# Patient Record
Sex: MALE | Race: BLACK OR AFRICAN AMERICAN | Employment: OTHER | URBAN - METROPOLITAN AREA
[De-identification: names, ages, dates, MRNs, and addresses within clinical notes are randomized per-mention and may not be internally consistent; named-entity substitution may affect disease eponyms.]

---

## 2020-03-21 ENCOUNTER — APPOINTMENT (OUTPATIENT)
Dept: ULTRASOUND IMAGING | Age: 65
End: 2020-03-21
Attending: EMERGENCY MEDICINE
Payer: MEDICARE

## 2020-03-21 ENCOUNTER — HOSPITAL ENCOUNTER (EMERGENCY)
Age: 65
Discharge: HOME | End: 2020-03-21
Attending: EMERGENCY MEDICINE
Payer: MEDICARE

## 2020-03-21 VITALS
HEART RATE: 73 BPM | DIASTOLIC BLOOD PRESSURE: 95 MMHG | SYSTOLIC BLOOD PRESSURE: 176 MMHG | RESPIRATION RATE: 18 BRPM | TEMPERATURE: 98 F | OXYGEN SATURATION: 98 % | WEIGHT: 185 LBS | BODY MASS INDEX: 24.52 KG/M2 | HEIGHT: 73 IN

## 2020-03-21 DIAGNOSIS — M79.89 LEG SWELLING: Primary | ICD-10-CM

## 2020-03-21 PROCEDURE — 99283 EMERGENCY DEPT VISIT LOW MDM: CPT

## 2020-03-21 PROCEDURE — 93971 EXTREMITY STUDY: CPT
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
## 2020-03-22 NOTE — DISCHARGE INSTRUCTIONS
Follow up with one of the doctors listed. Return to the ER if your symptoms worsen.     [address] [facility] [address]  [phone]    [facility]  [phone] [phone]
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
## 2020-03-22 NOTE — ED NOTES
I have reviewed discharge instructions with the patient. The patient verbalized understanding. Patient left ED via Discharge Method: ambulatory to Home with self    Opportunity for questions and clarification provided. Patient given 0 scripts. To continue your aftercare when you leave the hospital, you may receive an automated call from our care team to check in on how you are doing. This is a free service and part of our promise to provide the best care and service to meet your aftercare needs.  If you have questions, or wish to unsubscribe from this service please call [phone]. Thank you for Choosing our [facility] Emergency Department.

## 2020-04-26 ENCOUNTER — APPOINTMENT (OUTPATIENT)
Dept: GENERAL RADIOLOGY | Age: 65
DRG: 856 | End: 2020-04-26
Attending: EMERGENCY MEDICINE
Payer: MEDICARE

## 2020-04-26 ENCOUNTER — HOSPITAL ENCOUNTER (INPATIENT)
Age: 65
LOS: 5 days | Discharge: HOME | DRG: 856 | End: 2020-05-01
Attending: EMERGENCY MEDICINE | Admitting: HOSPITALIST
Payer: MEDICARE

## 2020-04-26 DIAGNOSIS — T84.7XXA HARDWARE COMPLICATING WOUND INFECTION, INITIAL ENCOUNTER (HCC): ICD-10-CM

## 2020-04-26 DIAGNOSIS — L03.116 CELLULITIS OF LEFT LOWER EXTREMITY: Primary | ICD-10-CM

## 2020-04-26 PROBLEM — A41.9 SEPSIS (HCC): Status: ACTIVE | Noted: 2020-04-26

## 2020-04-26 PROBLEM — R44.3 HALLUCINATIONS: Status: ACTIVE | Noted: 2020-04-26

## 2020-04-26 PROBLEM — L08.9: Status: ACTIVE | Noted: 2020-04-26

## 2020-04-26 PROBLEM — S90.522A: Status: ACTIVE | Noted: 2020-04-26

## 2020-04-26 LAB
ALBUMIN SERPL-MCNC: 3 G/DL (ref 3.2–4.6)
ALBUMIN/GLOB SERPL: 0.6 {RATIO} (ref 1.2–3.5)
ALP SERPL-CCNC: 79 U/L (ref 50–136)
ALT SERPL-CCNC: 15 U/L (ref 12–65)
ANION GAP SERPL CALC-SCNC: 8 MMOL/L (ref 7–16)
AST SERPL-CCNC: 9 U/L (ref 15–37)
BASOPHILS # BLD: 0 K/UL (ref 0–0.2)
BASOPHILS NFR BLD: 0 % (ref 0–2)
BILIRUB SERPL-MCNC: 0.5 MG/DL (ref 0.2–1.1)
BUN SERPL-MCNC: 13 MG/DL (ref 8–23)
CALCIUM SERPL-MCNC: 9.7 MG/DL (ref 8.3–10.4)
CHLORIDE SERPL-SCNC: 104 MMOL/L (ref 98–107)
CO2 SERPL-SCNC: 27 MMOL/L (ref 21–32)
CREAT SERPL-MCNC: 1.04 MG/DL (ref 0.8–1.5)
DIFFERENTIAL METHOD BLD: ABNORMAL
EOSINOPHIL # BLD: 0 K/UL (ref 0–0.8)
EOSINOPHIL NFR BLD: 0 % (ref 0.5–7.8)
ERYTHROCYTE [DISTWIDTH] IN BLOOD BY AUTOMATED COUNT: 14.1 % (ref 11.9–14.6)
GLOBULIN SER CALC-MCNC: 5.3 G/DL (ref 2.3–3.5)
GLUCOSE BLD STRIP.AUTO-MCNC: 119 MG/DL (ref 65–100)
GLUCOSE SERPL-MCNC: 124 MG/DL (ref 65–100)
HCT VFR BLD AUTO: 34.4 % (ref 41.1–50.3)
HGB BLD-MCNC: 11 G/DL (ref 13.6–17.2)
IMM GRANULOCYTES # BLD AUTO: 0 K/UL (ref 0–0.5)
IMM GRANULOCYTES NFR BLD AUTO: 0 % (ref 0–5)
LACTATE SERPL-SCNC: 1.5 MMOL/L (ref 0.4–2)
LYMPHOCYTES # BLD: 1.1 K/UL (ref 0.5–4.6)
LYMPHOCYTES NFR BLD: 8 % (ref 13–44)
MCH RBC QN AUTO: 27 PG (ref 26.1–32.9)
MCHC RBC AUTO-ENTMCNC: 32 G/DL (ref 31.4–35)
MCV RBC AUTO: 84.5 FL (ref 79.6–97.8)
MONOCYTES # BLD: 0.8 K/UL (ref 0.1–1.3)
MONOCYTES NFR BLD: 6 % (ref 4–12)
NEUTS SEG # BLD: 12.3 K/UL (ref 1.7–8.2)
NEUTS SEG NFR BLD: 86 % (ref 43–78)
NRBC # BLD: 0 K/UL (ref 0–0.2)
PLATELET # BLD AUTO: 328 K/UL (ref 150–450)
PMV BLD AUTO: 9.8 FL (ref 9.4–12.3)
POTASSIUM SERPL-SCNC: 3.7 MMOL/L (ref 3.5–5.1)
PROCALCITONIN SERPL-MCNC: <0.05 NG/ML
PROT SERPL-MCNC: 8.3 G/DL (ref 6.3–8.2)
RBC # BLD AUTO: 4.07 M/UL (ref 4.23–5.6)
SODIUM SERPL-SCNC: 139 MMOL/L (ref 136–145)
WBC # BLD AUTO: 14.3 K/UL (ref 4.3–11.1)

## 2020-04-26 PROCEDURE — 99283 EMERGENCY DEPT VISIT LOW MDM: CPT

## 2020-04-26 PROCEDURE — 74011250636 HC RX REV CODE- 250/636: Performed by: EMERGENCY MEDICINE

## 2020-04-26 PROCEDURE — 87040 BLOOD CULTURE FOR BACTERIA: CPT

## 2020-04-26 PROCEDURE — 84145 PROCALCITONIN (PCT): CPT

## 2020-04-26 PROCEDURE — 87186 SC STD MICRODIL/AGAR DIL: CPT

## 2020-04-26 PROCEDURE — 87205 SMEAR GRAM STAIN: CPT

## 2020-04-26 PROCEDURE — 36415 COLL VENOUS BLD VENIPUNCTURE: CPT

## 2020-04-26 PROCEDURE — 74011000258 HC RX REV CODE- 258: Performed by: EMERGENCY MEDICINE

## 2020-04-26 PROCEDURE — 80053 COMPREHEN METABOLIC PANEL: CPT

## 2020-04-26 PROCEDURE — 96375 TX/PRO/DX INJ NEW DRUG ADDON: CPT

## 2020-04-26 PROCEDURE — 65270000029 HC RM PRIVATE

## 2020-04-26 PROCEDURE — 74011250636 HC RX REV CODE- 250/636: Performed by: HOSPITALIST

## 2020-04-26 PROCEDURE — 74011250637 HC RX REV CODE- 250/637: Performed by: HOSPITALIST

## 2020-04-26 PROCEDURE — 83605 ASSAY OF LACTIC ACID: CPT

## 2020-04-26 PROCEDURE — 73590 X-RAY EXAM OF LOWER LEG: CPT

## 2020-04-26 PROCEDURE — 85025 COMPLETE CBC W/AUTO DIFF WBC: CPT

## 2020-04-26 PROCEDURE — 87077 CULTURE AEROBIC IDENTIFY: CPT

## 2020-04-26 PROCEDURE — 82962 GLUCOSE BLOOD TEST: CPT

## 2020-04-26 PROCEDURE — 96365 THER/PROPH/DIAG IV INF INIT: CPT

## 2020-04-26 PROCEDURE — 73610 X-RAY EXAM OF ANKLE: CPT

## 2020-04-26 RX ORDER — IBUPROFEN 200 MG
1 TABLET ORAL
Status: DISCONTINUED | OUTPATIENT
Start: 2020-04-26 | End: 2020-05-01 | Stop reason: HOSPADM

## 2020-04-26 RX ORDER — VANCOMYCIN 2 GRAM/500 ML IN 0.9 % SODIUM CHLORIDE INTRAVENOUS
2000 ONCE
Status: COMPLETED | OUTPATIENT
Start: 2020-04-26 | End: 2020-04-26

## 2020-04-26 RX ORDER — ENOXAPARIN SODIUM 100 MG/ML
40 INJECTION SUBCUTANEOUS EVERY 24 HOURS
Status: DISCONTINUED | OUTPATIENT
Start: 2020-04-26 | End: 2020-04-27

## 2020-04-26 RX ORDER — ACETAMINOPHEN 325 MG/1
650 TABLET ORAL
Status: DISCONTINUED | OUTPATIENT
Start: 2020-04-26 | End: 2020-05-01 | Stop reason: HOSPADM

## 2020-04-26 RX ORDER — ADHESIVE BANDAGE
30 BANDAGE TOPICAL DAILY PRN
Status: DISCONTINUED | OUTPATIENT
Start: 2020-04-26 | End: 2020-05-01 | Stop reason: HOSPADM

## 2020-04-26 RX ORDER — SODIUM CHLORIDE 0.9 % (FLUSH) 0.9 %
5-40 SYRINGE (ML) INJECTION AS NEEDED
Status: DISCONTINUED | OUTPATIENT
Start: 2020-04-26 | End: 2020-05-01 | Stop reason: HOSPADM

## 2020-04-26 RX ORDER — HYDROCODONE BITARTRATE AND ACETAMINOPHEN 7.5; 325 MG/1; MG/1
1 TABLET ORAL
Status: DISCONTINUED | OUTPATIENT
Start: 2020-04-26 | End: 2020-04-29

## 2020-04-26 RX ORDER — MORPHINE SULFATE 2 MG/ML
2 INJECTION, SOLUTION INTRAMUSCULAR; INTRAVENOUS
Status: DISCONTINUED | OUTPATIENT
Start: 2020-04-26 | End: 2020-04-29

## 2020-04-26 RX ORDER — ONDANSETRON 2 MG/ML
4 INJECTION INTRAMUSCULAR; INTRAVENOUS
Status: DISCONTINUED | OUTPATIENT
Start: 2020-04-26 | End: 2020-05-01 | Stop reason: HOSPADM

## 2020-04-26 RX ORDER — DIPHENHYDRAMINE HCL 25 MG
25 CAPSULE ORAL
Status: DISCONTINUED | OUTPATIENT
Start: 2020-04-26 | End: 2020-04-30

## 2020-04-26 RX ORDER — SODIUM CHLORIDE 9 MG/ML
125 INJECTION, SOLUTION INTRAVENOUS CONTINUOUS
Status: DISCONTINUED | OUTPATIENT
Start: 2020-04-26 | End: 2020-04-27

## 2020-04-26 RX ORDER — SODIUM CHLORIDE 0.9 % (FLUSH) 0.9 %
5-40 SYRINGE (ML) INJECTION EVERY 8 HOURS
Status: DISCONTINUED | OUTPATIENT
Start: 2020-04-26 | End: 2020-05-01 | Stop reason: HOSPADM

## 2020-04-26 RX ORDER — NALOXONE HYDROCHLORIDE 0.4 MG/ML
0.4 INJECTION, SOLUTION INTRAMUSCULAR; INTRAVENOUS; SUBCUTANEOUS AS NEEDED
Status: DISCONTINUED | OUTPATIENT
Start: 2020-04-26 | End: 2020-05-01 | Stop reason: HOSPADM

## 2020-04-26 RX ORDER — LANOLIN ALCOHOL/MO/W.PET/CERES
100 CREAM (GRAM) TOPICAL DAILY
Status: DISCONTINUED | OUTPATIENT
Start: 2020-04-27 | End: 2020-05-01 | Stop reason: HOSPADM

## 2020-04-26 RX ORDER — VANCOMYCIN HYDROCHLORIDE
1250 EVERY 12 HOURS
Status: DISCONTINUED | OUTPATIENT
Start: 2020-04-27 | End: 2020-04-27

## 2020-04-26 RX ORDER — LORAZEPAM 2 MG/ML
2 INJECTION INTRAMUSCULAR
Status: DISCONTINUED | OUTPATIENT
Start: 2020-04-26 | End: 2020-04-30

## 2020-04-26 RX ADMIN — Medication 10 ML: at 21:31

## 2020-04-26 RX ADMIN — PIPERACILLIN SODIUM,TAZOBACTAM SODIUM 4.5 G: 4; .5 INJECTION, POWDER, FOR SOLUTION INTRAVENOUS at 16:39

## 2020-04-26 RX ADMIN — ENOXAPARIN SODIUM 40 MG: 40 INJECTION SUBCUTANEOUS at 21:31

## 2020-04-26 RX ADMIN — SODIUM CHLORIDE 1000 ML: 900 INJECTION, SOLUTION INTRAVENOUS at 16:39

## 2020-04-26 RX ADMIN — ACETAMINOPHEN 650 MG: 325 TABLET, FILM COATED ORAL at 23:49

## 2020-04-26 RX ADMIN — SODIUM CHLORIDE 125 ML/HR: 900 INJECTION, SOLUTION INTRAVENOUS at 21:31

## 2020-04-26 RX ADMIN — VANCOMYCIN HYDROCHLORIDE 2000 MG: 10 INJECTION, POWDER, LYOPHILIZED, FOR SOLUTION INTRAVENOUS at 17:47

## 2020-04-26 NOTE — PROGRESS NOTES
TRANSFER - IN REPORT:    Verbal report received from Aly RN(name) on Will Mian  being received from ED(unit) for routine progression of care      Report consisted of patients Situation, Background, Assessment and   Recommendations(SBAR). Information from the following report(s) SBAR, Kardex, ED Summary, Intake/Output, MAR, Accordion and Recent Results was reviewed with the receiving nurse. Opportunity for questions and clarification was provided. Assessment completed upon patients arrival to unit and care assumed.

## 2020-04-26 NOTE — PROGRESS NOTES
04/26/20 1905   Dual Skin Pressure Injury Assessment   Dual Skin Pressure Injury Assessment WDL   Second Care Provider (Based on Facility Policy) Zina Jeronimo RN   Skin Integumentary   Skin Integumentary (WDL) X   Skin Color Ecchymosis (comment)  (Scattered.)   Skin Condition/Temp Dry;Flaky;Fragile; Warm   Skin Integrity Wound (add Wound LDA)  (L ankle. )   Turgor Non-tenting   Hair Growth Present   Varicosities Absent    Pressure  Injury Documentation No Pressure Injury Noted-Pressure Ulcer Prevention Initiated   Wound Prevention and Protection Methods   Orientation of Wound Prevention Posterior   Location of Wound Prevention Sacrum/Coccyx   Wound Offloading (Prevention Methods) Bed, pressure reduction mattress;Pillows;Repositioning     Sacral area intact. Dry, flaky skin. Area noted to Left ankle. Skin turgor good. Mucous membranes pink, moist, intact. Thick, discolored toenails. One suture noted to ankle.

## 2020-04-26 NOTE — ED NOTES
TRANSFER - OUT REPORT:    Verbal report given to Tammy Ramez (name) on Sukhdeep Dueñas  being transferred to [address] [place] [address] (unit) for routine progression of care       Report consisted of patients Situation, Background, Assessment and   Recommendations(SBAR). Information from the following report(s) SBAR was reviewed with the receiving nurse. Lines:   Peripheral IV 04/26/20 Left Hand (Active)   Site Assessment Clean, dry, & intact 4/26/2020  3:45 PM   Phlebitis Assessment 0 4/26/2020  3:45 PM   Infiltration Assessment 0 4/26/2020  3:45 PM   Dressing Status Clean, dry, & intact 4/26/2020  3:45 PM   Dressing Type Transparent 4/26/2020  3:45 PM   Hub Color/Line Status Pink 4/26/2020  3:45 PM       Peripheral IV 04/26/20 Left Forearm (Active)   Site Assessment Clean, dry, & intact 4/26/2020  4:45 PM   Phlebitis Assessment 0 4/26/2020  4:45 PM   Infiltration Assessment 0 4/26/2020  4:45 PM   Dressing Status Clean, dry, & intact 4/26/2020  4:45 PM        Opportunity for questions and clarification was provided.       Patient transported with:   Paradial
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
## 2020-04-26 NOTE — PROGRESS NOTES
Pharmacokinetic Consult to Pharmacist    Natalee Mario is a 75 y.o. male being treated for SSTI with vancomycin. Height: 6' 1\" (185.4 cm)  Weight: 81.6 kg (180 lb)  Lab Results   Component Value Date/Time    BUN 13 04/26/2020 03:45 PM    Creatinine 1.04 04/26/2020 03:45 PM    WBC 14.3 (H) 04/26/2020 03:45 PM    Procalcitonin <0.05 04/26/2020 04:00 PM    Lactic acid 1.5 04/26/2020 04:07 PM      Estimated Creatinine Clearance: 79 mL/min (based on SCr of 1.04 mg/dL). CULTURES:  4/26 BCx: pending   Wound: pending      Day 1 of vancomycin. Goal trough is 10-20. Vancomycin dose initiated at 2g load x1, then 1250mg q 12h. Will continue to follow patient and order levels when clinically indicated.     Thank you,  Nickolas Peters, PharmD  Clinical Pharmacist  [phone]

## 2020-04-27 LAB
ANION GAP SERPL CALC-SCNC: 6 MMOL/L (ref 7–16)
BUN SERPL-MCNC: 11 MG/DL (ref 8–23)
CALCIUM SERPL-MCNC: 8.9 MG/DL (ref 8.3–10.4)
CHLORIDE SERPL-SCNC: 109 MMOL/L (ref 98–107)
CO2 SERPL-SCNC: 26 MMOL/L (ref 21–32)
CREAT SERPL-MCNC: 0.84 MG/DL (ref 0.8–1.5)
ERYTHROCYTE [DISTWIDTH] IN BLOOD BY AUTOMATED COUNT: 13.9 % (ref 11.9–14.6)
GLUCOSE SERPL-MCNC: 102 MG/DL (ref 65–100)
HCT VFR BLD AUTO: 32.1 % (ref 41.1–50.3)
HGB BLD-MCNC: 10.3 G/DL (ref 13.6–17.2)
MCH RBC QN AUTO: 27.2 PG (ref 26.1–32.9)
MCHC RBC AUTO-ENTMCNC: 32.1 G/DL (ref 31.4–35)
MCV RBC AUTO: 84.7 FL (ref 79.6–97.8)
NRBC # BLD: 0 K/UL (ref 0–0.2)
PLATELET # BLD AUTO: 314 K/UL (ref 150–450)
PMV BLD AUTO: 10.1 FL (ref 9.4–12.3)
POTASSIUM SERPL-SCNC: 3.8 MMOL/L (ref 3.5–5.1)
RBC # BLD AUTO: 3.79 M/UL (ref 4.23–5.6)
SODIUM SERPL-SCNC: 141 MMOL/L (ref 136–145)
WBC # BLD AUTO: 11.9 K/UL (ref 4.3–11.1)

## 2020-04-27 PROCEDURE — 74011250636 HC RX REV CODE- 250/636: Performed by: HOSPITALIST

## 2020-04-27 PROCEDURE — 65270000029 HC RM PRIVATE

## 2020-04-27 PROCEDURE — 85027 COMPLETE CBC AUTOMATED: CPT

## 2020-04-27 PROCEDURE — 36415 COLL VENOUS BLD VENIPUNCTURE: CPT

## 2020-04-27 PROCEDURE — 80048 BASIC METABOLIC PNL TOTAL CA: CPT

## 2020-04-27 PROCEDURE — 74011250637 HC RX REV CODE- 250/637: Performed by: HOSPITALIST

## 2020-04-27 RX ADMIN — Medication 5 ML: at 14:29

## 2020-04-27 RX ADMIN — Medication 10 ML: at 05:20

## 2020-04-27 RX ADMIN — ACETAMINOPHEN 650 MG: 325 TABLET, FILM COATED ORAL at 16:17

## 2020-04-27 RX ADMIN — Medication 10 ML: at 22:31

## 2020-04-27 RX ADMIN — VANCOMYCIN HYDROCHLORIDE 1250 MG: 10 INJECTION, POWDER, LYOPHILIZED, FOR SOLUTION INTRAVENOUS at 07:41

## 2020-04-27 RX ADMIN — Medication 100 MG: at 08:21
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
## 2020-04-27 NOTE — PROGRESS NOTES
Chart screened by  for potential discharge needs or concerns. None identified at this time. Please notify/consult  if any discharge needs arise.     Discharge Location  Discharge Placement: Unable to determine at this time

## 2020-04-27 NOTE — PROGRESS NOTES
Problem: Falls - Risk of  Goal: *Absence of Falls  Description: Document Cristian Collins Fall Risk and appropriate interventions in the flowsheet.   Outcome: Progressing Towards Goal  Note: Fall Risk Interventions:  Mobility Interventions: Bed/chair exit alarm, Communicate number of staff needed for ambulation/transfer, Utilize walker, cane, or other assistive device    Mentation Interventions: Bed/chair exit alarm    Medication Interventions: Bed/chair exit alarm    Elimination Interventions: Bed/chair exit alarm, Call light in reach, Patient to call for help with toileting needs, Urinal in reach              Problem: Patient Education: Go to Patient Education Activity  Goal: Patient/Family Education  Outcome: Progressing Towards Goal     Problem: Infection - Risk of, Multi-drug Resistant Organism Colonization (MDRO)  Goal: *Absence of MDRO colonization  Outcome: Progressing Towards Goal  Goal: *Absence of infection signs and symptoms  Outcome: Progressing Towards Goal     Problem: Patient Education: Go to Patient Education Activity  Goal: Patient/Family Education  Outcome: Progressing Towards Goal     Problem: Pain  Goal: *Control of Pain  Outcome: Progressing Towards Goal  Goal: *PALLIATIVE CARE:  Alleviation of Pain  Outcome: Progressing Towards Goal     Problem: Patient Education: Go to Patient Education Activity  Goal: Patient/Family Education  Outcome: Progressing Towards Goal

## 2020-04-27 NOTE — H&P (VIEW-ONLY)
Consult Patient: El Flannery MRN: [id number]  SSN: [id number] YOB: 1955  Age: 75 y.o. Sex: male Subjective:  
  
El Flannery is a 75 y.o. male it was very hard to get any history from. He does not seem to give appropriate answers to any of my questions. It appears from his history that at some point he had surgery for ankle fracture in [place]. He presented to our hospital with drainage from a left ankle wound. He thinks the surgery was over a year ago. No past medical history on file. No past surgical history on file. FAMHX -No history of inflammatory arthritis Social History Tobacco Use  Smoking status: Not on file Substance Use Topics  Alcohol use: Not on file Current Facility-Administered Medications Medication Dose Route Frequency Provider Last Rate Last Dose  [START ON 4/28/2020] Vancomycin Trough Level Reminder   Other Cristian King MD      
 sodium chloride (NS) flush 5-40 mL  5-40 mL IntraVENous Q8H Melissa Iglesias MD   10 mL at 04/27/20 0520  
 sodium chloride (NS) flush 5-40 mL  5-40 mL IntraVENous PRN Melissa Iglesias MD      
 acetaminophen (TYLENOL) tablet 650 mg  650 mg Oral Q4H PRN Melissa Iglesias MD   650 mg at 04/26/20 2349  
 HYDROcodone-acetaminophen (NORCO) 7.5-325 mg per tablet 1 Tab  1 Tab Oral Q4H PRN Melissa Iglesias MD      
 morphine injection 2 mg  2 mg IntraVENous Q4H PRN Melissa Iglesias MD      
 naloxone [facility]) injection 0.4 mg  0.4 mg IntraVENous PRN Melissa Iglesias MD      
 diphenhydrAMINE (BENADRYL) capsule 25 mg  25 mg Oral Q4H PRN Melissa Iglesias MD      
 ondansetron [facility]) injection 4 mg  4 mg IntraVENous Q4H PRN Melissa Iglesias MD      
 magnesium hydroxide (MILK OF MAGNESIA) 400 mg/5 mL oral suspension 30 mL  30 mL Oral DAILY PRN MD Rosa Denny nicotine (NICODERM CQ) 21 mg/24 hr patch 1 Patch  1 Patch TransDERmal DAILY PRN Melissa Iglesias MD      
  LORazepam (ATIVAN) injection 2 mg  2 mg IntraVENous Q2H PRN Kedar White MD      
 thiamine HCL (B-1) tablet 100 mg  100 mg Oral DAILY Kedar White MD   100 mg at 04/27/20 1029 No Known Allergies Review of Systems: I do not believe he is able to give a review of systems A comprehensive review of systems was negative except for that written in the History of Present Illness. Objective:  
 
Vitals:  
 04/26/20 2338 04/27/20 0347 04/27/20 0738 04/27/20 1125 BP: 131/86 147/74 154/88 169/88 Pulse: (!) 102 84 (!) 101 (!) 103 Resp: 18 18 17 18 Temp: (!) 101 °F (38.3 °C) 98.9 °F (37.2 °C) 99.7 °F (37.6 °C) 98.1 °F (36.7 °C) SpO2: 98% 98% 96% 96% Weight:      
Height:      
  
 
Physical Exam: 
Physical Exam: 
General:  Alert, cooperative, no distress, appears stated age. Orientation he appears to be oriented to person Eyes:  Conjunctivae/corneas clear. PERRL, EOMs intact. Fundi benign Ears:  Normal TMs and external ear canals both ears. Nose: Nares normal. Septum midline. Mucosa normal. No drainage or sinus tenderness. Mouth/Throat: Lips, mucosa, and tongue normal. Teeth and gums normal.  
Neck: Supple, symmetrical, trachea midline, no adenopathy, thyroid: no enlargment/tenderness/nodules, no carotid bruit and no JVD. Back:   Symmetric, no curvature. ROM normal. No CVA tenderness. Lungs:   Clear to auscultation bilaterally. Heart:  Regular rate and rhythm, S1, S2 normal, no murmur, click, rub or gallop. Abdomen:   Soft, non-tender. Bowel sounds normal. No masses,  No organomegaly. His left lower extremity shows an open wound in line with his previous incision which is consistent with his open reduction internal fixation of his left lateral malleolus. There is some purulent drainage from this area. He is very tender to palpation but withdraws even with light touch throughout his entire left lower extremity.   Is difficult to get a detailed neurologic examination but his sensation appears to be grossly intact. Is also difficult to have him hold still long enough to really see if he has intact pulses but his capillary refill is intact. Assessment:  
 
Hospital Problems  Never Reviewed Codes Class Noted POA Sepsis ([facility] [address] ICD-10-CM: A41.9 ICD-9-CM: 038.9, 995.91  4/26/2020 Unknown Infected blister of left ankle ICD-10-CM: D15.005F, L08.9 ICD-9-CM: 916.3  4/26/2020 Unknown Hallucinations ICD-10-CM: R44.3 ICD-9-CM: 780.1  4/26/2020 Unknown *Operative wound infection left ankle for lateral malleolus fracture Xrays and or studies: 
 
X-rays appear to show a healed left lateral malleolus fracture with plate and screw fixation and syndesmosis screws Plan:  
 
I believe that as long as he is not clinically septic it would be wise to hold his IV antibiotics. The best chance of clearing this would be to be able to treat him with culture directed antibiotic therapy. The best chance to get a surgical culture that actually shows what is the culprit as far as what bacteria is causing this would be to get intraoperative cultures off of IV antibiotics so I am going to hold his vancomycin as long as the primary care team thinks this is reasonable. Obviously if the primary care team thinks that he really needs this for his overall health then that would be a different story but as long as I think it is reasonable to try to wait until after intraoperative cultures I think that would be to his advantage. If I had known about the patient and he had been n.p.o. we could have done his surgery today. However it appears that he has eaten breakfast and lunch. The plan will be to likely proceed with hardware removal and debridement of his left ankle which will probably be Wednesday now because of OR availability.  
 
Signed By: Aiden Art MD

## 2020-04-27 NOTE — PROGRESS NOTES
Consult received: chart reviewed. Patient underwent ORIF for trimalleolar ankle fracture 8/19' and has been seen in [facility] for wound infection. He was referred to wound care back in October. If still infected may require hardware removal. Will see tomorrow AM and discuss with Dr. Ivan Landaverde.  Will make NPO after midnight and hold lovenox

## 2020-04-27 NOTE — CONSULTS
Consult    Patient: Gaurav Bergman MRN: [id number]  SSN: [id number]    YOB: 1955  Age: 75 y.o. Sex: male      Subjective:      Gaurav Bergman is a 75 y.o. male it was very hard to get any history from. He does not seem to give appropriate answers to any of my questions. It appears from his history that at some point he had surgery for ankle fracture in [place]. He presented to our hospital with drainage from a left ankle wound. He thinks the surgery was over a year ago. No past medical history on file. No past surgical history on file.    FAMHX -No history of inflammatory arthritis   Social History     Tobacco Use    Smoking status: Not on file   Substance Use Topics    Alcohol use: Not on file      Current Facility-Administered Medications   Medication Dose Route Frequency Provider Last Rate Last Dose    [START ON 4/28/2020] Vancomycin Trough Level Reminder   Other Arpit Henderson MD        sodium chloride (NS) flush 5-40 mL  5-40 mL IntraVENous Q8H Thierry Floyd MD   10 mL at 04/27/20 0520    sodium chloride (NS) flush 5-40 mL  5-40 mL IntraVENous PRN Thierry Floyd MD        acetaminophen (TYLENOL) tablet 650 mg  650 mg Oral Q4H PRN Thierry Floyd MD   650 mg at 04/26/20 2349    HYDROcodone-acetaminophen (NORCO) 7.5-325 mg per tablet 1 Tab  1 Tab Oral Q4H PRN Thierry Floyd MD        morphine injection 2 mg  2 mg IntraVENous Q4H PRN Thierry Floyd MD        naloxone [facility]) injection 0.4 mg  0.4 mg IntraVENous PRN Thierry Floyd MD        diphenhydrAMINE (BENADRYL) capsule 25 mg  25 mg Oral Q4H PRN Thierry Floyd MD        ondansetron [facility]) injection 4 mg  4 mg IntraVENous Q4H PRN Thierry Floyd MD        magnesium hydroxide (MILK OF MAGNESIA) 400 mg/5 mL oral suspension 30 mL  30 mL Oral DAILY PRN MD Wolf Johnson nicotine (NICODERM CQ) 21 mg/24 hr patch 1 Patch  1 Patch TransDERmal DAILY PRN Thierry Floyd MD        LORazepam (ATIVAN) injection 2 mg  2 mg IntraVENous Q2H PRN Chico Vaughn MD        thiamine HCL (B-1) tablet 100 mg  100 mg Oral DAILY Chico Vaughn MD   100 mg at 04/27/20 0470        No Known Allergies    Review of Systems: I do not believe he is able to give a review of systems  A comprehensive review of systems was negative except for that written in the History of Present Illness. Objective:     Vitals:    04/26/20 2338 04/27/20 0347 04/27/20 0738 04/27/20 1125   BP: 131/86 147/74 154/88 169/88   Pulse: (!) 102 84 (!) 101 (!) 103   Resp: 18 18 17 18   Temp: (!) 101 °F (38.3 °C) 98.9 °F (37.2 °C) 99.7 °F (37.6 °C) 98.1 °F (36.7 °C)   SpO2: 98% 98% 96% 96%   Weight:       Height:            Physical Exam:  Physical Exam:  General:  Alert, cooperative, no distress, appears stated age. Orientation he appears to be oriented to person   Eyes:  Conjunctivae/corneas clear. PERRL, EOMs intact. Fundi benign   Ears:  Normal TMs and external ear canals both ears. Nose: Nares normal. Septum midline. Mucosa normal. No drainage or sinus tenderness. Mouth/Throat: Lips, mucosa, and tongue normal. Teeth and gums normal.   Neck: Supple, symmetrical, trachea midline, no adenopathy, thyroid: no enlargment/tenderness/nodules, no carotid bruit and no JVD. Back:   Symmetric, no curvature. ROM normal. No CVA tenderness. Lungs:   Clear to auscultation bilaterally. Heart:  Regular rate and rhythm, S1, S2 normal, no murmur, click, rub or gallop. Abdomen:   Soft, non-tender. Bowel sounds normal. No masses,  No organomegaly. His left lower extremity shows an open wound in line with his previous incision which is consistent with his open reduction internal fixation of his left lateral malleolus. There is some purulent drainage from this area. He is very tender to palpation but withdraws even with light touch throughout his entire left lower extremity.   Is difficult to get a detailed neurologic examination but his sensation appears to be grossly intact. Is also difficult to have him hold still long enough to really see if he has intact pulses but his capillary refill is intact. Assessment:     Hospital Problems  Never Reviewed          Codes Class Noted POA    Sepsis ([facility] [address] ICD-10-CM: A41.9  ICD-9-CM: 038.9, 995.91  4/26/2020 Unknown        Infected blister of left ankle ICD-10-CM: L81.045L, L08.9  ICD-9-CM: 916.3  4/26/2020 Unknown        Hallucinations ICD-10-CM: R44.3  ICD-9-CM: 780.1  4/26/2020 Unknown            *Operative wound infection left ankle for lateral malleolus fracture    Xrays and or studies:    X-rays appear to show a healed left lateral malleolus fracture with plate and screw fixation and syndesmosis screws  Plan:     I believe that as long as he is not clinically septic it would be wise to hold his IV antibiotics. The best chance of clearing this would be to be able to treat him with culture directed antibiotic therapy. The best chance to get a surgical culture that actually shows what is the culprit as far as what bacteria is causing this would be to get intraoperative cultures off of IV antibiotics so I am going to hold his vancomycin as long as the primary care team thinks this is reasonable. Obviously if the primary care team thinks that he really needs this for his overall health then that would be a different story but as long as I think it is reasonable to try to wait until after intraoperative cultures I think that would be to his advantage. If I had known about the patient and he had been n.p.o. we could have done his surgery today. However it appears that he has eaten breakfast and lunch. The plan will be to likely proceed with hardware removal and debridement of his left ankle which will probably be Wednesday now because of OR availability.     Signed By: Seb Rehamn MD

## 2020-04-28 PROCEDURE — 74011250637 HC RX REV CODE- 250/637: Performed by: HOSPITALIST

## 2020-04-28 PROCEDURE — 65270000029 HC RM PRIVATE

## 2020-04-28 RX ADMIN — Medication 100 MG: at 08:24

## 2020-04-28 RX ADMIN — Medication 10 ML: at 20:38

## 2020-04-28 RX ADMIN — Medication 10 ML: at 15:37

## 2020-04-28 RX ADMIN — Medication 10 ML: at 05:57

## 2020-04-28 NOTE — PROGRESS NOTES
Initial visit by  to convey care and concern and encourage patient that  services are available if desired. Mr. Heidi Serrano seemed discouraged. Mr. Heidi Serrano has no local family support. I offered spiritual interventions including empathic listening, affirmation of emotions, exploration of coping skills, and prayer as requested. Chaplains remain available for support.      Cherry Stephens 66  Board Certified

## 2020-04-28 NOTE — PROGRESS NOTES
2020             Admit Date: 2020  Admit Diagnosis: Infected blister of left ankle [S90.522A, L08.9]       Principle Problem: <principal problem not specified>. Subjective: No new complaints, No SOB, No Chest Pain, No Nausea or Vomiting     Objective:   Vital Signs are Stable, No Acute Distress, Alert,  Dressing is Dry. Assessment / Plan :  Patient Active Problem List   Diagnosis Code    [facility] A41.9    Infected blister of left ankle S90.522A, L08.9    Hallucinations R44.3      Patient Vitals for the past 8 hrs:   BP Temp Pulse Resp SpO2   20 0731 159/84 99.1 °F (37.3 °C) 89 17 95 %   20 0351 151/66 98.8 °F (37.1 °C) 97 16 97 %    Temp (24hrs), Av.1 °F (37.3 °C), Min:98.1 °F (36.7 °C), Max:100.5 °F (38.1 °C)    Body mass index is 23.75 kg/m². Lab Results   Component Value Date/Time    HGB 10.3 (L) 2020 05:14 AM          Medical Mgmt per hospitalist  Plan for surgery today or tomorrow.  Will discuss with Dr. Javad Samuels By: ROMA Bailey  2020,  7:59 AM

## 2020-04-28 NOTE — PROGRESS NOTES
Bladder scan performed and reveals 250mL of urine. Patient denies pain and discomfort. Patient denies urinary urgency. Will continue to monitor patient.

## 2020-04-28 NOTE — PROGRESS NOTES
Problem: Falls - Risk of  Goal: *Absence of Falls  Description: Document Earma Ruth Fall Risk and appropriate interventions in the flowsheet.   Outcome: Progressing Towards Goal  Note: Fall Risk Interventions:  Mobility Interventions: Bed/chair exit alarm, Communicate number of staff needed for ambulation/transfer, Patient to call before getting OOB, Utilize walker, cane, or other assistive device    Mentation Interventions: Bed/chair exit alarm    Medication Interventions: Bed/chair exit alarm    Elimination Interventions: Bed/chair exit alarm, Call light in reach, Patient to call for help with toileting needs              Problem: Patient Education: Go to Patient Education Activity  Goal: Patient/Family Education  Outcome: Progressing Towards Goal     Problem: Infection - Risk of, Multi-drug Resistant Organism Colonization (MDRO)  Goal: *Absence of MDRO colonization  Outcome: Progressing Towards Goal  Goal: *Absence of infection signs and symptoms  Outcome: Progressing Towards Goal     Problem: Patient Education: Go to Patient Education Activity  Goal: Patient/Family Education  Outcome: Progressing Towards Goal     Problem: Pain  Goal: *Control of Pain  Outcome: Progressing Towards Goal  Goal: *PALLIATIVE CARE:  Alleviation of Pain  Outcome: Progressing Towards Goal     Problem: Patient Education: Go to Patient Education Activity  Goal: Patient/Family Education  Outcome: Progressing Towards Goal

## 2020-04-28 NOTE — PROGRESS NOTES
Problem: Patient Education: Go to Patient Education Activity  Goal: Patient/Family Education  Outcome: Progressing Towards Goal     Problem: Infection - Risk of, Multi-drug Resistant Organism Colonization (MDRO)  Goal: *Absence of MDRO colonization  Outcome: Progressing Towards Goal  Goal: *Absence of infection signs and symptoms  Outcome: Progressing Towards Goal     Problem: Patient Education: Go to Patient Education Activity  Goal: Patient/Family Education  Outcome: Progressing Towards Goal     Problem: Pain  Goal: *Control of Pain  Outcome: Progressing Towards Goal  Goal: *PALLIATIVE CARE:  Alleviation of Pain  Outcome: Progressing Towards Goal     Problem: Patient Education: Go to Patient Education Activity  Goal: Patient/Family Education  Outcome: Progressing Towards Goal

## 2020-04-28 NOTE — PROGRESS NOTES
INTERNAL MEDICINE H&P/CONSULT    Subjective:     Patient is a 75years old AA male, homeless man w/ no health care and on no medications with history of left ankle surgery after an accident in late 2019, presents with non-healing wound at his left malleolus w/ some drainage and swelling of left lower leg that has been intermittent and flaring up intermittently since he had surgery. No fever or chills. Drainage he describes as pus/ bloody w/ no foul smell. In ED, he was hallucinating, seeing bugs. He states that he used to drink imported champagne and his last drink was 2 y ago. Today, no fever, pt answers to questions are elusive, pain controlled    Objective: Intake / Output:  04/28 0701 - 04/28 1900  In: -   Out: 200 [Urine:200]  04/26 1901 - 04/28 0700  In: -   Out: 540 [Urine:540]    Physical Exam:  Visit Vitals  /79 (BP 1 Location: Left arm, BP Patient Position: At rest)   Pulse 77   Temp 99.4 °F (37.4 °C)   Resp 18   Ht 6' 1\" (1.854 m)   Wt 81.6 kg (180 lb)   SpO2 96%   BMI 23.75 kg/m²     General appearance: awake, alert, cooperative, moderate distress, appears stated age   Lungs: clear to auscultation bilaterally -  Heart: RRR, S1, S2 normal, no murmur, click, rub or gallop. Abdomen: soft, no tenderness, no distension, normal bowel sound, no masses, no organomegaly  Extremities: atraumatic, no cyanosis - Bilateral lower limbs edema none. Skin: L foot in dressing  Neurologic: Grossly intact    ECG: sinus rhythm     Data Review (Labs):   No results found for this or any previous visit (from the past 24 hour(s)).     Assessment:     Active Problems:    Sepsis ([address] (4/26/2020) mild, resolved      Infected blister of left ankle (4/26/2020) postop, hardware from 2019, a/w non-compliance and lack of health care, planned removal of hardware      Hallucinations (4/26/2020) cannot r/o alcoholism although pt is not showing typical picture of withdrawal      Plan:     Hardware to be removed per Orthopedic  Vanco IV initially, Ok to stop and await intraoperative culture- growing S. aureus  Follow other blood and wound cultures  Pain control  Alcohol withdrawal preventive measures  Blood pressure control  AM lab as needed    Dispo; TBD, pt is homeless     Signed By: Yvette Jacobsen MD     April 28, 2020

## 2020-04-28 NOTE — PROGRESS NOTES
SW consulted to assist with coordination of outpatient dental referral. Lore Aly will provide patient with list of dental options in [facility] with contact information. Patient informs he will contact to schedule appointment at his convenience. SW reviewed patient's chart on this date and met with patient at bedside. Per hospital protocol, SW wore facemask and maintained appropriate social distance. Patient confirmed he is experiencing homelessness. When asked where he stays nightly, he infomrs that he lives in his car. He has social security income, and reports he is able to maintain his vehicle, gasoline, and car insurance. He typically uses gas station facilities for toileting and hygiene needs. He has utilized local homeless shelters in past, and declines information or connection to shelters at this time. Also declining information for [employer]. Reports he has a wheelchair, walker, and \"boot\" in his vehicle. He is taking steps to obtain an apartment for himself. Currently pending surgical procedure (hardware removal and debridement) planned for tomorrow. Anticipate PT/OT evaluations post-operatively. If needed at time of DC, patient reports he will consider STR placement. If rehab is not indicated, patient intends to DC back to his vehicle / prior living situation.

## 2020-04-29 ENCOUNTER — APPOINTMENT (OUTPATIENT)
Dept: GENERAL RADIOLOGY | Age: 65
DRG: 856 | End: 2020-04-29
Attending: HOSPITALIST
Payer: MEDICARE

## 2020-04-29 ENCOUNTER — APPOINTMENT (OUTPATIENT)
Dept: GENERAL RADIOLOGY | Age: 65
DRG: 856 | End: 2020-04-29
Attending: ORTHOPAEDIC SURGERY
Payer: MEDICARE

## 2020-04-29 ENCOUNTER — ANESTHESIA EVENT (OUTPATIENT)
Dept: SURGERY | Age: 65
DRG: 856 | End: 2020-04-29
Payer: MEDICARE

## 2020-04-29 ENCOUNTER — ANESTHESIA (OUTPATIENT)
Dept: SURGERY | Age: 65
DRG: 856 | End: 2020-04-29
Payer: MEDICARE

## 2020-04-29 LAB
BACTERIA SPEC CULT: ABNORMAL
BACTERIA SPEC CULT: ABNORMAL
GRAM STN SPEC: ABNORMAL
SERVICE CMNT-IMP: ABNORMAL

## 2020-04-29 PROCEDURE — 87176 TISSUE HOMOGENIZATION CULTR: CPT

## 2020-04-29 PROCEDURE — 77030013708 HC HNDPC SUC IRR PULS STRY –B: Performed by: ORTHOPAEDIC SURGERY

## 2020-04-29 PROCEDURE — 76210000006 HC OR PH I REC 0.5 TO 1 HR: Performed by: ORTHOPAEDIC SURGERY

## 2020-04-29 PROCEDURE — 87205 SMEAR GRAM STAIN: CPT

## 2020-04-29 PROCEDURE — 76942 ECHO GUIDE FOR BIOPSY: CPT | Performed by: ORTHOPAEDIC SURGERY

## 2020-04-29 PROCEDURE — 74011250636 HC RX REV CODE- 250/636: Performed by: HOSPITALIST

## 2020-04-29 PROCEDURE — 0SPG04Z REMOVAL OF INTERNAL FIXATION DEVICE FROM LEFT ANKLE JOINT, OPEN APPROACH: ICD-10-PCS | Performed by: ORTHOPAEDIC SURGERY

## 2020-04-29 PROCEDURE — 77030002986 HC SUT PROL J&J -A: Performed by: ORTHOPAEDIC SURGERY

## 2020-04-29 PROCEDURE — 74011250637 HC RX REV CODE- 250/637: Performed by: ORTHOPAEDIC SURGERY

## 2020-04-29 PROCEDURE — 0QBK0ZZ EXCISION OF LEFT FIBULA, OPEN APPROACH: ICD-10-PCS | Performed by: ORTHOPAEDIC SURGERY

## 2020-04-29 PROCEDURE — 65270000029 HC RM PRIVATE

## 2020-04-29 PROCEDURE — 87077 CULTURE AEROBIC IDENTIFY: CPT

## 2020-04-29 PROCEDURE — 74011250636 HC RX REV CODE- 250/636: Performed by: ORTHOPAEDIC SURGERY

## 2020-04-29 PROCEDURE — 74011250636 HC RX REV CODE- 250/636: Performed by: NURSE ANESTHETIST, CERTIFIED REGISTERED

## 2020-04-29 PROCEDURE — 77030003602 HC NDL NRV BLK BBMI -B: Performed by: NURSE ANESTHETIST, CERTIFIED REGISTERED

## 2020-04-29 PROCEDURE — 87186 SC STD MICRODIL/AGAR DIL: CPT

## 2020-04-29 PROCEDURE — 88311 DECALCIFY TISSUE: CPT

## 2020-04-29 PROCEDURE — 76010000161 HC OR TIME 1 TO 1.5 HR INTENSV-TIER 1: Performed by: ORTHOPAEDIC SURGERY

## 2020-04-29 PROCEDURE — 77030002916 HC SUT ETHLN J&J -A: Performed by: ORTHOPAEDIC SURGERY

## 2020-04-29 PROCEDURE — 74011250636 HC RX REV CODE- 250/636: Performed by: ANESTHESIOLOGY

## 2020-04-29 PROCEDURE — 76060000033 HC ANESTHESIA 1 TO 1.5 HR: Performed by: ORTHOPAEDIC SURGERY

## 2020-04-29 PROCEDURE — 76010010054 HC POST OP PAIN BLOCK: Performed by: ORTHOPAEDIC SURGERY

## 2020-04-29 PROCEDURE — 88305 TISSUE EXAM BY PATHOLOGIST: CPT

## 2020-04-29 PROCEDURE — 77030019895 HC PCKNG STRP IODO -A: Performed by: ORTHOPAEDIC SURGERY

## 2020-04-29 PROCEDURE — 73610 X-RAY EXAM OF ANKLE: CPT

## 2020-04-29 PROCEDURE — 74011000250 HC RX REV CODE- 250: Performed by: NURSE ANESTHETIST, CERTIFIED REGISTERED

## 2020-04-29 PROCEDURE — 77030000032 HC CUF TRNQT ZIMM -B: Performed by: ORTHOPAEDIC SURGERY

## 2020-04-29 PROCEDURE — 87075 CULTR BACTERIA EXCEPT BLOOD: CPT

## 2020-04-29 RX ORDER — LIDOCAINE HYDROCHLORIDE 10 MG/ML
0.1 INJECTION INFILTRATION; PERINEURAL AS NEEDED
Status: DISCONTINUED | OUTPATIENT
Start: 2020-04-29 | End: 2020-04-29 | Stop reason: HOSPADM

## 2020-04-29 RX ORDER — HYDROMORPHONE HYDROCHLORIDE 2 MG/ML
0.5 INJECTION, SOLUTION INTRAMUSCULAR; INTRAVENOUS; SUBCUTANEOUS
Status: DISCONTINUED | OUTPATIENT
Start: 2020-04-29 | End: 2020-04-29 | Stop reason: HOSPADM

## 2020-04-29 RX ORDER — NALOXONE HYDROCHLORIDE 0.4 MG/ML
0.1 INJECTION, SOLUTION INTRAMUSCULAR; INTRAVENOUS; SUBCUTANEOUS AS NEEDED
Status: DISCONTINUED | OUTPATIENT
Start: 2020-04-29 | End: 2020-04-29 | Stop reason: HOSPADM

## 2020-04-29 RX ORDER — SODIUM CHLORIDE, SODIUM LACTATE, POTASSIUM CHLORIDE, CALCIUM CHLORIDE 600; 310; 30; 20 MG/100ML; MG/100ML; MG/100ML; MG/100ML
75 INJECTION, SOLUTION INTRAVENOUS CONTINUOUS
Status: DISCONTINUED | OUTPATIENT
Start: 2020-04-29 | End: 2020-04-29 | Stop reason: HOSPADM

## 2020-04-29 RX ORDER — OXYCODONE HYDROCHLORIDE 5 MG/1
5 TABLET ORAL
Status: DISCONTINUED | OUTPATIENT
Start: 2020-04-29 | End: 2020-04-29 | Stop reason: HOSPADM

## 2020-04-29 RX ORDER — ROPIVACAINE HYDROCHLORIDE 5 MG/ML
INJECTION, SOLUTION EPIDURAL; INFILTRATION; PERINEURAL
Status: COMPLETED | OUTPATIENT
Start: 2020-04-29 | End: 2020-04-29

## 2020-04-29 RX ORDER — SODIUM CHLORIDE, SODIUM LACTATE, POTASSIUM CHLORIDE, CALCIUM CHLORIDE 600; 310; 30; 20 MG/100ML; MG/100ML; MG/100ML; MG/100ML
INJECTION, SOLUTION INTRAVENOUS
Status: DISCONTINUED | OUTPATIENT
Start: 2020-04-29 | End: 2020-04-29 | Stop reason: HOSPADM

## 2020-04-29 RX ORDER — ONDANSETRON 2 MG/ML
4 INJECTION INTRAMUSCULAR; INTRAVENOUS ONCE
Status: DISCONTINUED | OUTPATIENT
Start: 2020-04-29 | End: 2020-04-29 | Stop reason: HOSPADM

## 2020-04-29 RX ORDER — DIPHENHYDRAMINE HYDROCHLORIDE 50 MG/ML
12.5 INJECTION, SOLUTION INTRAMUSCULAR; INTRAVENOUS ONCE
Status: DISCONTINUED | OUTPATIENT
Start: 2020-04-29 | End: 2020-04-29 | Stop reason: HOSPADM

## 2020-04-29 RX ORDER — MIDAZOLAM HYDROCHLORIDE 1 MG/ML
2 INJECTION, SOLUTION INTRAMUSCULAR; INTRAVENOUS
Status: COMPLETED | OUTPATIENT
Start: 2020-04-29 | End: 2020-04-29

## 2020-04-29 RX ORDER — SODIUM CHLORIDE, SODIUM LACTATE, POTASSIUM CHLORIDE, CALCIUM CHLORIDE 600; 310; 30; 20 MG/100ML; MG/100ML; MG/100ML; MG/100ML
1000 INJECTION, SOLUTION INTRAVENOUS CONTINUOUS
Status: DISCONTINUED | OUTPATIENT
Start: 2020-04-29 | End: 2020-04-29 | Stop reason: HOSPADM

## 2020-04-29 RX ORDER — LABETALOL HYDROCHLORIDE 5 MG/ML
10 INJECTION, SOLUTION INTRAVENOUS ONCE
Status: DISCONTINUED | OUTPATIENT
Start: 2020-04-29 | End: 2020-04-29

## 2020-04-29 RX ORDER — LIDOCAINE HYDROCHLORIDE 20 MG/ML
INJECTION, SOLUTION EPIDURAL; INFILTRATION; INTRACAUDAL; PERINEURAL AS NEEDED
Status: DISCONTINUED | OUTPATIENT
Start: 2020-04-29 | End: 2020-04-29 | Stop reason: HOSPADM

## 2020-04-29 RX ORDER — PROPOFOL 10 MG/ML
INJECTION, EMULSION INTRAVENOUS AS NEEDED
Status: DISCONTINUED | OUTPATIENT
Start: 2020-04-29 | End: 2020-04-29 | Stop reason: HOSPADM

## 2020-04-29 RX ORDER — MORPHINE SULFATE 10 MG/ML
6 INJECTION, SOLUTION INTRAMUSCULAR; INTRAVENOUS
Status: DISCONTINUED | OUTPATIENT
Start: 2020-04-29 | End: 2020-05-01 | Stop reason: HOSPADM

## 2020-04-29 RX ORDER — OXYCODONE HYDROCHLORIDE 5 MG/1
10 TABLET ORAL
Status: DISCONTINUED | OUTPATIENT
Start: 2020-04-29 | End: 2020-05-01 | Stop reason: HOSPADM

## 2020-04-29 RX ORDER — VANCOMYCIN 2 GRAM/500 ML IN 0.9 % SODIUM CHLORIDE INTRAVENOUS
2000 ONCE
Status: COMPLETED | OUTPATIENT
Start: 2020-04-29 | End: 2020-04-29

## 2020-04-29 RX ORDER — FENTANYL CITRATE 50 UG/ML
100 INJECTION, SOLUTION INTRAMUSCULAR; INTRAVENOUS AS NEEDED
Status: DISCONTINUED | OUTPATIENT
Start: 2020-04-29 | End: 2020-04-29 | Stop reason: HOSPADM

## 2020-04-29 RX ORDER — PROPOFOL 10 MG/ML
INJECTION, EMULSION INTRAVENOUS
Status: DISCONTINUED | OUTPATIENT
Start: 2020-04-29 | End: 2020-04-29 | Stop reason: HOSPADM

## 2020-04-29 RX ORDER — OXYCODONE HYDROCHLORIDE 5 MG/1
10 TABLET ORAL
Status: DISCONTINUED | OUTPATIENT
Start: 2020-04-29 | End: 2020-04-29 | Stop reason: HOSPADM

## 2020-04-29 RX ORDER — SODIUM CHLORIDE, SODIUM LACTATE, POTASSIUM CHLORIDE, CALCIUM CHLORIDE 600; 310; 30; 20 MG/100ML; MG/100ML; MG/100ML; MG/100ML
75 INJECTION, SOLUTION INTRAVENOUS CONTINUOUS
Status: DISCONTINUED | OUTPATIENT
Start: 2020-04-29 | End: 2020-04-30

## 2020-04-29 RX ADMIN — MORPHINE SULFATE 2 MG: 2 INJECTION, SOLUTION INTRAMUSCULAR; INTRAVENOUS at 03:45

## 2020-04-29 RX ADMIN — MORPHINE SULFATE 6 MG: 10 INJECTION INTRAVENOUS at 20:36

## 2020-04-29 RX ADMIN — MIDAZOLAM HYDROCHLORIDE 2 MG: 2 INJECTION, SOLUTION INTRAMUSCULAR; INTRAVENOUS at 09:27

## 2020-04-29 RX ADMIN — VANCOMYCIN HYDROCHLORIDE 2000 MG: 10 INJECTION, POWDER, LYOPHILIZED, FOR SOLUTION INTRAVENOUS at 14:25

## 2020-04-29 RX ADMIN — FENTANYL CITRATE 100 MCG: 50 INJECTION, SOLUTION INTRAMUSCULAR; INTRAVENOUS at 09:27

## 2020-04-29 RX ADMIN — SODIUM CHLORIDE, SODIUM LACTATE, POTASSIUM CHLORIDE, AND CALCIUM CHLORIDE 75 ML/HR: 600; 310; 30; 20 INJECTION, SOLUTION INTRAVENOUS at 14:25

## 2020-04-29 RX ADMIN — Medication 10 ML: at 05:46

## 2020-04-29 RX ADMIN — ROPIVACAINE HYDROCHLORIDE 20 ML: 150 INJECTION, SOLUTION EPIDURAL; INFILTRATION; PERINEURAL at 09:48

## 2020-04-29 RX ADMIN — LIDOCAINE HYDROCHLORIDE 60 MG: 20 INJECTION, SOLUTION EPIDURAL; INFILTRATION; INTRACAUDAL; PERINEURAL at 11:31

## 2020-04-29 RX ADMIN — PROPOFOL 20 MG: 10 INJECTION, EMULSION INTRAVENOUS at 11:39

## 2020-04-29 RX ADMIN — Medication 10 ML: at 13:51

## 2020-04-29 RX ADMIN — SODIUM CHLORIDE, SODIUM LACTATE, POTASSIUM CHLORIDE, AND CALCIUM CHLORIDE 75 ML/HR: 600; 310; 30; 20 INJECTION, SOLUTION INTRAVENOUS at 20:38

## 2020-04-29 RX ADMIN — ROPIVACAINE HYDROCHLORIDE 30 ML: 150 INJECTION, SOLUTION EPIDURAL; INFILTRATION; PERINEURAL at 09:48

## 2020-04-29 RX ADMIN — PROPOFOL 100 MCG/KG/MIN: 10 INJECTION, EMULSION INTRAVENOUS at 11:33

## 2020-04-29 RX ADMIN — PROPOFOL 10 MG: 10 INJECTION, EMULSION INTRAVENOUS at 11:48

## 2020-04-29 RX ADMIN — PROPOFOL 20 MG: 10 INJECTION, EMULSION INTRAVENOUS at 11:31

## 2020-04-29 RX ADMIN — PROPOFOL 10 MG: 10 INJECTION, EMULSION INTRAVENOUS at 11:41

## 2020-04-29 RX ADMIN — SODIUM CHLORIDE, SODIUM LACTATE, POTASSIUM CHLORIDE, AND CALCIUM CHLORIDE: 600; 310; 30; 20 INJECTION, SOLUTION INTRAVENOUS at 11:24

## 2020-04-29 RX ADMIN — OXYCODONE HYDROCHLORIDE 10 MG: 5 TABLET ORAL at 14:32

## 2020-04-29 NOTE — PERIOP NOTES
Received phone notification from Lorraine Saenz in lab of MRSA grown from wound culture from 4/26/2020 from pt's leg wound. Dr Bossman Kramer notified of results. No new orders.

## 2020-04-29 NOTE — ANESTHESIA PREPROCEDURE EVALUATION
Relevant Problems   No relevant active problems       Anesthetic History   No history of anesthetic complications            Review of Systems / Medical History  Patient summary reviewed and pertinent labs reviewed    Pulmonary  Within defined limits                 Neuro/Psych       CVA: no residual symptoms       Cardiovascular    Hypertension              Exercise tolerance: >4 METS  Comments: EF preserved in past echo   GI/Hepatic/Renal  Within defined limits              Endo/Other  Within defined limits           Other Findings   Comments: Sepsis  Ankle Infection    Etoh withdrawal protocols         Physical Exam    Airway  Mallampati: II  TM Distance: 4 - 6 cm  Neck ROM: normal range of motion   Mouth opening: Normal     Cardiovascular    Rhythm: regular  Rate: normal         Dental  No notable dental hx       Pulmonary  Breath sounds clear to auscultation               Abdominal  GI exam deferred       Other Findings            Anesthetic Plan    ASA: 2  Anesthesia type: total IV anesthesia      Post-op pain plan if not by surgeon: peripheral nerve block single      Anesthetic plan and risks discussed with: Patient

## 2020-04-29 NOTE — PROGRESS NOTES
TRANSFER - OUT REPORT:    Verbal report given to Didi RN(name) on Ricky Davey  being transferred to pre op(unit) for ordered procedure       Report consisted of patients Situation, Background, Assessment and   Recommendations(SBAR). Information from the following report(s) SBAR was reviewed with the receiving nurse. Lines:   Peripheral IV 04/26/20 Left Hand (Active)   Site Assessment Clean, dry, & intact 4/28/2020  7:15 PM   Phlebitis Assessment 0 4/28/2020  7:15 PM   Infiltration Assessment 0 4/28/2020  7:15 PM   Dressing Status Clean, dry, & intact 4/28/2020  7:15 PM   Dressing Type Tape;Transparent 4/28/2020  7:15 PM   Hub Color/Line Status Flushed;Patent 4/28/2020  7:15 PM   Action Taken Open ports on tubing capped 4/28/2020  7:15 PM   Alcohol Cap Used No 4/28/2020  7:15 PM       Peripheral IV 04/26/20 Left Forearm (Active)   Site Assessment Clean, dry, & intact 4/28/2020  7:15 PM   Phlebitis Assessment 0 4/28/2020  7:15 PM   Infiltration Assessment 0 4/28/2020  7:15 PM   Dressing Status Clean, dry, & intact 4/28/2020  7:15 PM   Dressing Type Tape;Transparent 4/28/2020  7:15 PM   Hub Color/Line Status Flushed;Patent 4/28/2020  7:15 PM   Action Taken Open ports on tubing capped 4/28/2020  7:15 PM   Alcohol Cap Used No 4/28/2020  7:15 PM        Opportunity for questions and clarification was provided.       Patient transported with:   "[employer]

## 2020-04-29 NOTE — OP NOTES
Operative Report    Patient: Concepción Johnson MRN: [id number]  SSN: [id number]    YOB: 1955  Age: 75 y.o. Sex: male       Date of Surgery: 4/29/2020     History:  Concepción Johnson is a 75 y.o. male who apparently had open reduction internal fixation of a left lateral malleolus fracture several months ago at outside institution. He presented to our emergency room with a draining sinus tract and increasing swelling and pain in his left ankle. X-rays revealed that it did appear that his laterally this fracture had healed and did have plate and screw fixation in his lateral malleolus. I talked him about the need for removal of the hardware and debridement of his wound. He would likely need to be on IV antibiotics and hopefully cultures will direct long-term IV antibiotic therapy. He seemed understand and wished to proceed. I talked to the patient and/or their representative and explained the exact nature the procedure. I also went through a detailed list of the material risks associated with  the procedure which included risk of bleeding, infection, injury to nearby structures, worsening the situation, as well as the risks associate with anesthesia and finally death. Also talked with him regarding the benefits and alternatives to the procedure. Preoperative Diagnosis: INFECTED LEFT ANKLE     Postoperative Diagnosis: INFECTED LEFT ANKLE     Surgeon(s) and Role:     * Tawnya Baugh MD - Primary    Anesthesia: General     Procedure: Procedure(s):  -Removal of hardware left ankle, #2- excisional debridement of left ankle wound with debridement of skin subcutaneous tissue and bone less than 20 cm²    Procedure in Detail: The successful induction of general anesthesia the left lower extremity was prepped draped usual sterile fashion I then made an incision over his lateral malleolus ellipsing out his draining sinus tract.   I took this down sharply to the area of the plate and all the plate and screws were removed. I then captured some of the fluid and sent this for culture. Also captured some of the soft tissue around the fibula as well as the bone itself sending this for tissue culture and bone culture respectively. After the hardware was removed I used a curette to debride the screw holes himself. As I did the small muscle tissue were excised. I also used a rondure to debride the bone itself and small amounts of bone tissue as well as subcutaneous tissue were excised using a rondure. I then irrigated with a copious amount of normal saline. I then placed some quarter inch iodoform gauze packing in the anterior soft tissue area that appeared to be consistent with an abscess and then closed the lateral incision with a combination of 0 Prolene suture using horizontal mattress sutures and then a running locked two-point 0 nylon suture for the skin. Dressings were applied as well as a posterior splint. The patient was awakened and taken recovery in stable condition      Estimated Blood Loss: 150 cc    Tourniquet Time:   Total Tourniquet Time Documented:  Leg (Left) - 22 minutes  Total: Leg (Left) - 22 minutes        Implants: * No implants in log *            Specimens:   ID Type Source Tests Collected by Time Destination   1 : Left ankle bone Fresh Bone  Gibran Nguyen MD 4/29/2020 1206 Pathology   1 : Left Leg Wound Wound Ankle CULTURE, ANAEROBIC, CULTURE, WOUND W GRAM STAIN Gibran Nguyen MD 4/29/2020 1157 Microbiology   2 : Left ankle tissue Tissue Ankle CULTURE, ANAEROBIC, CULTURE, TISSUE W GRAM STAIN Gibran Nguyen MD 4/29/2020 1205 Microbiology           Drains: None                Complications: None    Counts: Sponge and needle counts were correct times two.     Signed By:  Aiden Art MD     April 29, 2020

## 2020-04-29 NOTE — ANESTHESIA POSTPROCEDURE EVALUATION
Procedure(s):  REMOVAL HARDWARE AND I & D LEFT ANKLE.    total IV anesthesia    Anesthesia Post Evaluation        Patient location during evaluation: PACU  Patient participation: complete - patient participated  Level of consciousness: awake  Pain management: satisfactory to patient  Airway patency: patent  Anesthetic complications: no  Cardiovascular status: hemodynamically stable  Respiratory status: spontaneous ventilation  Hydration status: euvolemic  Post anesthesia nausea and vomiting:  none      Vitals Value Taken Time   /83 4/29/2020  1:30 PM   Temp 36.8 °C (98.3 °F) 4/29/2020  1:09 PM   Pulse 70 4/29/2020  1:30 PM   Resp 16 4/29/2020  1:30 PM   SpO2 96 % 4/29/2020  1:30 PM

## 2020-04-29 NOTE — ANESTHESIA PROCEDURE NOTES
Peripheral Block    Start time: 4/29/2020 9:27 AM  End time: 4/29/2020 9:32 AM  Performed by: Vladislav Ríos MD  Authorized by: Vladislav Ríos MD       Pre-procedure: Indications: at surgeon's request, post-op pain management and procedure for pain    Preanesthetic Checklist: patient identified, risks and benefits discussed, site marked, timeout performed, anesthesia consent given and patient being monitored    Timeout Time: 09:27          Block Type:   Block Type:  Popliteal  Laterality:  Left  Monitoring:  Standard ASA monitoring, responsive to questions, continuous pulse ox, oxygen, frequent vital sign checks and heart rate  Injection Technique:  Single shot  Procedures: ultrasound guided and nerve stimulator    Patient position: Lateral Decubitus.   Prep: chlorhexidine    Location:  Lower thigh  Needle Type:  Stimuplex  Needle Gauge:  22 G  Needle Localization:  Nerve stimulator and ultrasound guidance  Motor Response: minimal motor response >0.4 mA      Assessment:  Number of attempts:  1  Injection Assessment:  Incremental injection every 5 mL, no paresthesia, ultrasound image on chart, local visualized surrounding nerve on ultrasound, negative aspiration for blood and no intravascular symptoms  Patient tolerance:  Patient tolerated the procedure well with no immediate complications

## 2020-04-29 NOTE — PROGRESS NOTES
Pharmacokinetic Consult to Pharmacist    Brian Clintons is a [facility] male being treated with vancomycin. Height: 6' 1\" (185.4 cm)  Weight: 81.6 kg (180 lb)  Lab Results   Component Value Date/Time    BUN 11 04/27/2020 05:14 AM    Creatinine 0.84 04/27/2020 05:14 AM    WBC 11.9 (H) 04/27/2020 05:14 AM    Procalcitonin <0.05 04/26/2020 04:00 PM    Lactic acid 1.5 04/26/2020 04:07 PM      Estimated Creatinine Clearance: 97.8 mL/min (by C-G formula based on SCr of 0.84 mg/dL). CULTURES:  pending    Day 1 of vancomycin. Goal trough is 15-20. Vancomycin dose initiated at 2000 mg x 1, then 1000 mq 12 hours. Will continue to follow patient and order levels when clinically indicated.     Thank you,  Rebecca Preston, PharmD  Clinical Pharmacist  [phone]

## 2020-04-29 NOTE — PROGRESS NOTES
INTERNAL MEDICINE H&P/CONSULT    Subjective:     Patient is a 75years old AA male, homeless man w/ no health care and on no medications with history of left ankle surgery after an accident in late 2019, presents with non-healing wound at his left malleolus w/ some drainage and swelling of left lower leg that has been intermittent and flaring up intermittently since he had surgery. No fever or chills. Drainage he describes as pus/ bloody w/ no foul smell. In ED, he was hallucinating, seeing bugs. He states that he used to drink imported champagne and his last drink was 2 y ago. Today, no fever, pt answers to questions are elusive, pain controlled, OR today    Objective: Intake / Output:  04/29 0701 - 04/29 1900  In: 320 [P.O.:120; I.V.:200]  Out: 905 [Urine:900]  04/27 1901 - 04/29 0700  In: -   Out: 1350 [Urine:1350]    Physical Exam:  Visit Vitals  BP (!) 156/92 (BP 1 Location: Left arm, BP Patient Position: At rest)   Pulse 82   Temp 98.1 °F (36.7 °C)   Resp 16   Ht 6' 1\" (1.854 m)   Wt 81.6 kg (180 lb)   SpO2 98%   BMI 23.75 kg/m²     General appearance: awake, alert, cooperative, moderate distress, appears stated age   Lungs: clear to auscultation bilaterally -  Heart: RRR, S1, S2 normal, no murmur, click, rub or gallop. Abdomen: soft, no tenderness, no distension, normal bowel sound, no masses, no organomegaly  Extremities: atraumatic, no cyanosis - Bilateral lower limbs edema none. Skin: L foot in surg dressing  Neurologic: Grossly intact    ECG: sinus rhythm     Data Review (Labs):   No results found for this or any previous visit (from the past 24 hour(s)).     Assessment:     Active Problems:    Sepsis ([address] (4/26/2020) mild, resolved      Infected blister of left ankle (4/26/2020) postop, hardware from 2019, a/w non-compliance and lack of health care, s/p removal of hardware and debridement, wound CS grew MRSA      Hallucinations on admission  ? (4/26/2020) cannot r/o alcoholism although pt is not showing typical picture of withdrawal      Plan:     Hardware removed per Orthopedic  Vanco IV  Will follow intra-op cultures  Pain control  Alcohol withdrawal preventive measures  Blood pressure control  AM lab as needed    Dispo; TBD, pt is homeless     Signed By: Jean Marie Medina MD     April 29, 2020

## 2020-04-29 NOTE — ANESTHESIA PROCEDURE NOTES
Peripheral Block    Start time: 4/29/2020 9:34 AM  End time: 4/29/2020 9:37 AM  Performed by: Rosangela Vargas MD  Authorized by: Rosangela Vargas MD       Pre-procedure: Indications: at surgeon's request, post-op pain management and procedure for pain    Preanesthetic Checklist: patient identified, risks and benefits discussed, site marked, timeout performed, anesthesia consent given and patient being monitored    Timeout Time: 09:34          Block Type:   Block Type:   Adductor canal  Laterality:  Left  Monitoring:  Standard ASA monitoring, responsive to questions, oxygen, continuous pulse ox, frequent vital sign checks and heart rate  Injection Technique:  Single shot  Procedures: ultrasound guided    Patient Position: supine  Prep: chlorhexidine    Location:  Mid thigh  Needle Type:  Stimuplex  Needle Gauge:  22 G  Needle Localization:  Ultrasound guidance    Assessment:  Number of attempts:  1  Injection Assessment:  Incremental injection every 5 mL, no paresthesia, ultrasound image on chart, no intravascular symptoms, negative aspiration for blood and local visualized surrounding nerve on ultrasound  Patient tolerance:  Patient tolerated the procedure well with no immediate complications

## 2020-04-29 NOTE — PROGRESS NOTES
TRANSFER - IN REPORT:    Verbal report received from [facility] on Chalmer Castillo  being received from 7th floorfor ordered procedure      Report consisted of patients Situation, Background, Assessment and   Recommendations(SBAR). Information from the following report(s) SBAR and Kardex was reviewed with the receiving nurse. Opportunity for questions and clarification was provided. Assessment completed upon patients arrival to unit and care assumed.

## 2020-04-29 NOTE — PROGRESS NOTES
Pt wishes to keep Left ankle hardware after removal. Reviewed with patient precautions and guidelines for handling surgical specimens. He voices understanding.   Release signed and Leticia Chicas at [facility] notified of patient's request.

## 2020-04-29 NOTE — PERIOP NOTES
TRANSFER - OUT REPORT:    Verbal report given to Jayashree Figueroa RN on Peyman Avery  being transferred to [address] [place] [address] for routine post - op       Report consisted of patients Situation, Background, Assessment and   Recommendations(SBAR). Information from the following report(s) SBAR, Kardex, OR Summary, Intake/Output, MAR and Cardiac Rhythm NSR was reviewed with the receiving nurse. Lines:   Peripheral IV 04/26/20 Left Hand (Active)   Site Assessment Clean, dry, & intact 4/29/2020 12:36 PM   Phlebitis Assessment 0 4/29/2020 12:36 PM   Infiltration Assessment 0 4/29/2020 12:36 PM   Dressing Status Clean, dry, & intact 4/29/2020 12:36 PM   Dressing Type Transparent 4/29/2020 12:36 PM   Hub Color/Line Status Pink;Patent 4/29/2020 12:36 PM   Action Taken Open ports on tubing capped 4/28/2020  7:15 PM   Alcohol Cap Used No 4/29/2020 12:36 PM       Peripheral IV 04/26/20 Left Forearm (Active)   Site Assessment Clean, dry, & intact 4/29/2020  7:35 AM   Phlebitis Assessment 0 4/29/2020  7:35 AM   Infiltration Assessment 0 4/29/2020  7:35 AM   Dressing Status Clean, dry, & intact 4/29/2020  7:35 AM   Dressing Type Transparent 4/29/2020  7:35 AM   Hub Color/Line Status Flushed;Patent 4/28/2020  7:15 PM   Action Taken Open ports on tubing capped 4/28/2020  7:15 PM   Alcohol Cap Used No 4/28/2020  7:15 PM        Opportunity for questions and clarification was provided. Patient transported with:   Tech    VTE prophylaxis orders have been written for Peyman Avery. Patient and family given floor number and nurses name. Family updated re: pt status after security code verified.

## 2020-04-29 NOTE — INTERVAL H&P NOTE
Update History & Physical    The Patient's History and Physical of April 27, 2020 was reviewed with the patient and I examined the patient. There was no change. The surgical site was confirmed by the patient and me. Plan:  The risk, benefits, expected outcome, and alternative to the recommended procedure have been discussed with the patient. Patient understands and wants to proceed with the procedure.     Electronically signed by Federico Petty MD on 4/29/2020 at 7:05 AM

## 2020-04-30 PROBLEM — M86.9 OSTEOMYELITIS (HCC): Status: ACTIVE | Noted: 2020-04-30

## 2020-04-30 PROCEDURE — 74011250636 HC RX REV CODE- 250/636: Performed by: HOSPITALIST

## 2020-04-30 PROCEDURE — 74011250637 HC RX REV CODE- 250/637: Performed by: ORTHOPAEDIC SURGERY

## 2020-04-30 PROCEDURE — 86580 TB INTRADERMAL TEST: CPT | Performed by: HOSPITALIST

## 2020-04-30 PROCEDURE — 74011250637 HC RX REV CODE- 250/637: Performed by: HOSPITALIST

## 2020-04-30 PROCEDURE — 74011250636 HC RX REV CODE- 250/636: Performed by: NURSE PRACTITIONER

## 2020-04-30 PROCEDURE — 65270000029 HC RM PRIVATE

## 2020-04-30 PROCEDURE — 74011000302 HC RX REV CODE- 302: Performed by: HOSPITALIST

## 2020-04-30 PROCEDURE — 74011250636 HC RX REV CODE- 250/636: Performed by: ORTHOPAEDIC SURGERY

## 2020-04-30 RX ORDER — CLINDAMYCIN PHOSPHATE 600 MG/50ML
600 INJECTION INTRAVENOUS EVERY 8 HOURS
Status: DISCONTINUED | OUTPATIENT
Start: 2020-04-30 | End: 2020-04-30

## 2020-04-30 RX ADMIN — Medication 5 ML: at 05:14

## 2020-04-30 RX ADMIN — Medication 5 ML: at 21:23

## 2020-04-30 RX ADMIN — MORPHINE SULFATE 6 MG: 10 INJECTION INTRAVENOUS at 14:33

## 2020-04-30 RX ADMIN — VANCOMYCIN HYDROCHLORIDE 1000 MG: 1 INJECTION, POWDER, LYOPHILIZED, FOR SOLUTION INTRAVENOUS at 14:33

## 2020-04-30 RX ADMIN — Medication 1 AMPULE: at 11:34

## 2020-04-30 RX ADMIN — OXYCODONE HYDROCHLORIDE 10 MG: 5 TABLET ORAL at 00:11

## 2020-04-30 RX ADMIN — Medication 100 MG: at 08:31

## 2020-04-30 RX ADMIN — ACETAMINOPHEN 650 MG: 325 TABLET, FILM COATED ORAL at 08:29

## 2020-04-30 RX ADMIN — OXYCODONE HYDROCHLORIDE 10 MG: 5 TABLET ORAL at 20:43

## 2020-04-30 RX ADMIN — Medication 1 AMPULE: at 20:43

## 2020-04-30 RX ADMIN — Medication 10 ML: at 14:14

## 2020-04-30 RX ADMIN — VANCOMYCIN HYDROCHLORIDE 1000 MG: 1 INJECTION, POWDER, LYOPHILIZED, FOR SOLUTION INTRAVENOUS at 03:15

## 2020-04-30 RX ADMIN — TUBERCULIN PURIFIED PROTEIN DERIVATIVE 5 UNITS: 5 INJECTION, SOLUTION INTRADERMAL at 11:34

## 2020-04-30 RX ADMIN — ACETAMINOPHEN 650 MG: 325 TABLET, FILM COATED ORAL at 00:12

## 2020-04-30 NOTE — PROGRESS NOTES
2020         Post Op day: 1 Day Post-Op Procedure(s) (LRB):  REMOVAL HARDWARE AND I & D LEFT ANKLE (Left)      Admit Date: 2020  Admit Diagnosis: Infected blister of left ankle [S90.522A, L08.9]       Principle Problem: <principal problem not specified>. Subjective: Doing well, No complaints, No SOB, No Chest Pain, No Nausea or Vomiting     Objective:   Vital Signs are Stable, No Acute Distress, Alert,  Dressing is Dry,  Neurovascular exam is normal.     Assessment / Plan :  Patient Active Problem List   Diagnosis Code    [facility] A41.9    Infected blister of left ankle S90.522A, L08.9    Hallucinations R44.3      Patient Vitals for the past 8 hrs:   BP Temp Pulse Resp SpO2   20 0733 161/75 98 °F (36.7 °C) 92 18 96 %   20 0409 159/88 98.1 °F (36.7 °C) 81 17 98 %   20 0135 122/54 98 °F (36.7 °C) 85 17 96 %    Temp (24hrs), Av.7 °F (37.1 °C), Min:97.4 °F (36.3 °C), Max:101.1 °F (38.4 °C)    Body mass index is 23.75 kg/m².     Lab Results   Component Value Date/Time    HGB 10.3 (L) 2020 05:14 AM        S/P removal of hardware left ankle  Medical Mgmt per hospitalist  Continue PT for ambulation   Fall Precuations  DC disp: JACKELYN to arrange   F/U in 2 weeks for suture removal          Signed By: ROMA Masters  2020,  8:05 AM

## 2020-04-30 NOTE — CONSULTS
Infectious Disease Consult    Today's Date: 4/30/2020   Admit Date: 4/26/2020    Impression:   · L ankle hardware-related infection, initial ORIF 8/2019 at [facility], now s/p hardware removal 4/29/2020, op cultures with staph aureus, pending; pre-op cultures MRSA  · Homelessness, living in his car    Plan:   ·  Will discontinue clindamycin and restart vancomycin. · He is not a good candidate for outpatient antibiotics, so will consider dalbavancin when he is ready for discharge. Anti-infectives:   Vancomycin (4/26 -4/27), (4/29 -  Zosyn (4/26)    Subjective:   Date of Consultation:  April 30, 2020  Referring Physician: Kaylyn Camejo, Hospitalist    Patient is a 75 y.o. male with homelessness and limited past health care. He required a L ankle ORIF in August 2019, last seen by Ortho Surgery at [facility] in January 2020 and at that time was doing well. He developed a non-healing wound over the past month, was seen in the ED 3/21/2020. He reports he received some antibiotic - unclear what that was. He then presented to the ED 4/26/2020 with purulent drainage and hallucinations. Drainage cultures were positive with MRSA. He was seen by Orthopedics and underwent I&D with hardware removal  4/29/2020. Operative cultures are positive with staph aureus, pending. He has been on vancomycin and today was started on clindamycin. Patient Active Problem List   Diagnosis Code    [facility] A41.9    Infected blister of left ankle S90.522A, L08.9    Hallucinations R44.3     No past medical history on file. No family history on file. Social History     Tobacco Use    Smoking status: Not on file   Substance Use Topics    Alcohol use: Not on file     No past surgical history on file. Prior to Admission medications    Not on File       No Known Allergies     Review of Systems:  Pertinent items are noted in the History of Present Illness.     Objective:     Visit Vitals  /75 (BP 1 Location: Right arm, BP Patient Position: At rest)   Pulse 92   Temp 98 °F (36.7 °C)   Resp 18   Ht 6' 1\" (1.854 m)   Wt 81.6 kg (180 lb)   SpO2 96%   BMI 23.75 kg/m²     Temp (24hrs), Av.7 °F (37.1 °C), Min:97.4 °F (36.3 °C), Max:101.1 °F (38.4 °C)       Lines:  Peripheral IV:       Physical Exam:    General:  Alert, cooperative, well nourished, well developed, appears stated age   Eyes:  Sclera anicteric, no drainage   Mouth/Throat: Mucous membranes normal, oral pharynx clear   Neck: Supple   Lungs:   Clear to auscultation bilaterally, good effort   CV:  Regular rate and rhythm, no audible murmur, click, rub or gallop   Abdomen:   Soft, non-tender. bowel sounds active, no distention   Extremities: No cyanosis ; LLE casted   Skin: Skin color, texture, turgor normal. no acute rash   Musculoskeletal: No swelling or deformity   Lines/Devices:  Intact, no erythema, drainage or tenderness   Psych: Alert, some word salad       Data Review:     CBC:  No results for input(s): WBC, GRANS, MONOS, EOS, ANEU, ABL, HGB, HCT, PLT, HGBEXT, HCTEXT, PLTEXT in the last 72 hours. No lab exists for component: LYMPHS,  ELVA    BMP:  No results for input(s): CREA, BUN, NA, K, CL, CO2, AGAP, GLU in the last 72 hours. LFTS:  No results for input(s): TBILI, ALT, SGOT, AP, TP, ALB in the last 72 hours.     Microbiology:     All Micro Results     Procedure Component Value Units Date/Time    CULTURE, Siomara Clas STAIN [[id number]]  (Abnormal) Collected:  20 1157    Order Status:  Completed Specimen:  Wound from Ankle Updated:  20 5113     Special Requests: NO SPECIAL REQUESTS        GRAM STAIN PENDING     Culture result:       LIGHT STAPHYLOCOCCUS AUREUS SUBCULTURE IN PROGRESS          CULTURE, TISSUE [address] [[id number]]  (Abnormal) Collected:  20 1205    Order Status:  Completed Specimen:  Ankle Updated:  20 8634     Special Requests: NO SPECIAL REQUESTS        GRAM STAIN PENDING     Culture result:       LIGHT STAPHYLOCOCCUS AUREUS SUBCULTURE IN PROGRESS          CULTURE, BLOOD [[id number]] Collected:  04/26/20 1607    Order Status:  Completed Specimen:  Blood Updated:  04/30/20 0712     Special Requests: --        LEFT  FOREARM       Culture result: NO GROWTH 4 DAYS       CULTURE, BLOOD [[id number]] Collected:  04/26/20 1600    Order Status:  Completed Specimen:  Blood Updated:  04/30/20 0712     Special Requests: --        RIGHT  Antecubital       Culture result: NO GROWTH 4 DAYS       CULTURE, ANAEROBIC [[id number]] Collected:  04/29/20 1205    Order Status:  Completed Specimen:  Surgical Specimen Updated:  04/29/20 1449    CULTURE, ANAEROBIC [[id number]] Collected:  04/29/20 1157    Order Status:  Completed Specimen:  Surgical Specimen Updated:  04/29/20 1449    CULTURE, WOUND Virgil Mckusick STAIN [[id number]]  (Abnormal)  (Susceptibility) Collected:  04/26/20 1820    Order Status:  Completed Specimen:  Wound from Leg Updated:  04/29/20 1347     Special Requests: NO SPECIAL REQUESTS        GRAM STAIN 0 TO 1 WBC'S/OIF      NO EPITHELIAL CELLS SEEN               FEW GRAM POS COCCI IN CLUSTERS           Culture result:       HEAVY * METHICILLIN RESISTANT STAPHYLOCOCCUS AUREUS *                  RESULTS VERIFIED, PHONED TO AND READ BACK BY  PRISCILLA PIMENTEL RN ON 4/29/20 @1305, AMM            Imaging:     L ankle 4/29/2020  FINDINGS:  3 views of the left ankle are submitted for evaluation. Plain film assessment is  limited by portable technique, patient motion, and splint artifact. There  appears to be interval removal of prior metallic hardware stabilizing the  tibiofibular syndesmosis and distal fibula. Tiny faint residual metallic  densities are seen which were present on the prior examination. Additional  wire-like densities in gauge the mid tarsal bones which were present on the  prior examination. No acute appearing fracture line, or evidence for dislocation  is seen.  Mild soft tissue gas is seen which is not clearly abnormal given the  recent surgery.  Persistent soft tissue edema is which may be improved although  this may be due to placement of a splint    Signed By: Albert Morris NP     April 30, 2020

## 2020-04-30 NOTE — PROGRESS NOTES
Pt assessed. Alert and oriented x 4. Stated that he is currently in pain and requested pain medication. Oxy 10mg given. Current temp 101.1 tylenol given. Pt is currently on Vanco and will given at 0300. Will continue to monitor.

## 2020-04-30 NOTE — PROGRESS NOTES
INTERNAL MEDICINE H&P/CONSULT    Subjective:     Patient is a 75years old AA male, homeless man w/ no health care and on no medications with history of left ankle surgery after an accident in late 2019, presents with non-healing wound at his left malleolus w/ some drainage and swelling of left lower leg that has been intermittent and flaring up intermittently since he had surgery. No fever or chills. Drainage he describes as pus/ bloody w/ no foul smell. In ED, he was hallucinating, seeing bugs. He states that he used to drink imported champagne and his last drink was 2 y ago. Today, no fever, pt answers to questions are elusive, pain controlled, stable postop    Objective: Intake / Output:  No intake/output data recorded. 04/28 1901 - 04/30 0700  In: 320 [P.O.:120; I.V.:200]  Out: 1705 [Urine:1700]    Physical Exam:  Visit Vitals  /69 (BP 1 Location: Right arm, BP Patient Position: At rest)   Pulse 82   Temp 98.2 °F (36.8 °C)   Resp 18   Ht 6' 1\" (1.854 m)   Wt 81.6 kg (180 lb)   SpO2 96%   BMI 23.75 kg/m²     General appearance: awake, alert, cooperative, moderate distress, appears stated age   Lungs: clear to auscultation bilaterally -  Heart: RRR, S1, S2 normal, no murmur, click, rub or gallop. Abdomen: soft, no tenderness, no distension, normal bowel sound, no masses, no organomegaly  Extremities: atraumatic, no cyanosis - Bilateral lower limbs edema none. Skin: L foot in surg dressing  Neurologic: Grossly intact    ECG: sinus rhythm     Data Review (Labs):   No results found for this or any previous visit (from the past 24 hour(s)).     Assessment:     Active Problems:    Sepsis ([address] (4/26/2020) mild, resolved      Infected blister of left ankle (4/26/2020) postop, hardware from 2019, a/w non-compliance and lack of health care, s/p removal of hardware and debridement, wound CS grew MRSA      Hallucinations on admission  ? (4/26/2020) cannot r/o alcoholism although pt is not showing typical picture of withdrawal      Plan:     Hardware removed per Orthopedic  Vanco IV per ID  Dalbavancin on discharge being homeless per ID  Pain control  Alcohol withdrawal preventive measures  Blood pressure control  AM lab as needed    Dispo; TBD, pt is homeless, CM consulted    Signed By: Jean Marie Medina MD     April 30, 2020

## 2020-04-30 NOTE — PROGRESS NOTES
SW met with pt to discuss the recommendation that he go to STR at [facility] for both physical conditioning but also wound care. Per hospital protocol, SW wore mask and maintained appropriate social distance; no direct physical contact. Pt is agreeable and expressed no preference of facility. He is agreeable to a referral to [facility], [place], operated by [facility]. Referral submitted. Awaiting a response. PPD placed today. SW following to facilitate pt's transfer to rehab at [facility]. Care Management Interventions  Mode of Transport at Discharge: BLS  Transition of Care Consult (CM Consult): SNF  Partner SNF: Yes  Discharge Durable Medical Equipment: No  Physical Therapy Consult: Yes  Occupational Therapy Consult: No  Speech Therapy Consult: No  Confirm Follow Up Transport: Self  The Plan for Transition of Care is Related to the Following Treatment Goals : Subacute rehab to improve pt's functional ability and for successful wound healing.   The Patient and/or Patient Representative was Provided with a Choice of Provider and Agrees with the Discharge Plan?: Yes  Discharge Location  Discharge Placement: Rehab Unit Subacute

## 2020-04-30 NOTE — WOUND CARE
Noted consult for post op wound care this am. Noted this order was cancelled. Defer to surgery/ortho, available if needed. Will sign off.

## 2020-05-01 VITALS
TEMPERATURE: 98.4 F | SYSTOLIC BLOOD PRESSURE: 138 MMHG | BODY MASS INDEX: 23.86 KG/M2 | WEIGHT: 180 LBS | DIASTOLIC BLOOD PRESSURE: 66 MMHG | RESPIRATION RATE: 18 BRPM | HEIGHT: 73 IN | OXYGEN SATURATION: 98 % | HEART RATE: 77 BPM

## 2020-05-01 PROBLEM — R44.3 HALLUCINATIONS: Status: RESOLVED | Noted: 2020-04-26 | Resolved: 2020-05-01

## 2020-05-01 LAB
BACTERIA SPEC CULT: NORMAL
BACTERIA SPEC CULT: NORMAL
SERVICE CMNT-IMP: NORMAL
SERVICE CMNT-IMP: NORMAL
VANCOMYCIN TROUGH SERPL-MCNC: 11.3 UG/ML (ref 5–20)

## 2020-05-01 PROCEDURE — 74011250637 HC RX REV CODE- 250/637: Performed by: ORTHOPAEDIC SURGERY

## 2020-05-01 PROCEDURE — 97110 THERAPEUTIC EXERCISES: CPT

## 2020-05-01 PROCEDURE — 80202 ASSAY OF VANCOMYCIN: CPT

## 2020-05-01 PROCEDURE — 97162 PT EVAL MOD COMPLEX 30 MIN: CPT

## 2020-05-01 PROCEDURE — 97163 PT EVAL HIGH COMPLEX 45 MIN: CPT

## 2020-05-01 PROCEDURE — 74011250637 HC RX REV CODE- 250/637: Performed by: HOSPITALIST

## 2020-05-01 PROCEDURE — 36415 COLL VENOUS BLD VENIPUNCTURE: CPT

## 2020-05-01 PROCEDURE — 74011250636 HC RX REV CODE- 250/636: Performed by: NURSE PRACTITIONER

## 2020-05-01 RX ORDER — VANCOMYCIN HYDROCHLORIDE
1250 EVERY 12 HOURS
Status: DISCONTINUED | OUTPATIENT
Start: 2020-05-01 | End: 2020-05-01 | Stop reason: HOSPADM

## 2020-05-01 RX ADMIN — OXYCODONE HYDROCHLORIDE 10 MG: 5 TABLET ORAL at 08:42

## 2020-05-01 RX ADMIN — OXYCODONE HYDROCHLORIDE 10 MG: 5 TABLET ORAL at 01:32

## 2020-05-01 RX ADMIN — Medication 1 AMPULE: at 08:39

## 2020-05-01 RX ADMIN — Medication 10 ML: at 05:10

## 2020-05-01 RX ADMIN — VANCOMYCIN HYDROCHLORIDE 1000 MG: 1 INJECTION, POWDER, LYOPHILIZED, FOR SOLUTION INTRAVENOUS at 04:03

## 2020-05-01 RX ADMIN — Medication 100 MG: at 08:39

## 2020-05-01 NOTE — ROUTINE PROCESS
Discharge instructions and prescriptions provided and explained to patient, patient voiced understanding. . No home meds or valuables to return. Opportunity for questions provided.

## 2020-05-01 NOTE — PROGRESS NOTES
Infectious Disease Progress Note    Today's Date: 2020   Admit Date: 2020    Impression:   · L ankle hardware-related infection, initial ORIF 2019 at [facility], now s/p hardware removal 2020, op cultures with staph aureus, pending; pre-op cultures MRSA  · Homelessness, living in his car    Plan:   · Reports plan to leave the hospital today regardless of medical advice. · ID plan:   · Dalbavancin 1500 mg IV 2020 at infusion center through peripheral IV - to be placed and removed at time of infusion  · Dalbavancin 1500 mg IV 2020 at infusion center through peripheral IV - to be placed and removed at time of infusion  · ID follow up will be scheduled ~2020   · He will need wound care per surgery team.     Anti-infectives:   Vancomycin ( -), ( -  Zosyn ()    Subjective: Interval History:   Denies complaints, but reports he has things to do today and needs to leave. Denies nausea, vomiting, diarrhea, fevers, chills. Reports some pain in the L ankle. Tmax 99.2. No Known Allergies     Review of Systems:  Pertinent items are noted in the History of Present Illness.     Objective:     Visit Vitals  /66 (BP 1 Location: Right arm, BP Patient Position: At rest)   Pulse 77   Temp 98.4 °F (36.9 °C)   Resp 18   Ht 6' 1\" (1.854 m)   Wt 81.6 kg (180 lb)   SpO2 98%   BMI 23.75 kg/m²     Temp (24hrs), Av.7 °F (37.1 °C), Min:98.2 °F (36.8 °C), Max:99.2 °F (37.3 °C)     General: Alert, no acute distress, appears stated age, well nourished and well developed  Head:    Normocephalic, atraumatic  Eyes:   Anicteric sclerae, no drainage, not injected, EOMI  Mouth:  Moist mucosa, op clear  Neck:   Supple, symmetrical, trachea midline, no JVD  Lungs:   Clear without increased work of breathing or audible wheezes  CV:   Regular rate and rhythm without audible murmur  Abdomen:  Soft, non tender, not distended, active bowel sounds  Extremities:  LLE casted, toes warm  Musculoskeletal: Moves all extremities  Skin:   No acute rash or lesions  Psych:   Alert, poor insight into medical condition  Lines:    benign      Data Review:     CBC:  No results for input(s): WBC, GRANS, MONOS, EOS, ANEU, ABL, HGB, HCT, PLT, HGBEXT, HCTEXT, PLTEXT, HGBEXT, HCTEXT, PLTEXT in the last 72 hours. No lab exists for component: LYMPHS,  ELVA    BMP:  No results for input(s): CREA, BUN, NA, K, CL, CO2, AGAP, GLU in the last 72 hours. LFTS:  No results for input(s): TBILI, ALT, SGOT, AP, TP, ALB in the last 72 hours. Microbiology:     All Micro Results     Procedure Component Value Units Date/Time    CULTURE, ANAEROBIC [[id number]] Collected:  04/29/20 1157    Order Status:  Completed Specimen:  Ankle Updated:  05/01/20 0927     Special Requests: NO SPECIAL REQUESTS        Culture result:       SUBCULTURE IS NECESSARY TO DETERMINE PRESENCE OR ABSENCE OF ANAEROBIC BACTERIA IN THIS CULTURE. FURTHER REPORT TO FOLLOW AFTER INCUBATION OF SUBCULTURE. CULTURE, ANAEROBIC [[id number]] Collected:  04/29/20 1205    Order Status:  Completed Specimen:  Ankle Updated:  05/01/20 0927     Special Requests: NO SPECIAL REQUESTS        Culture result:       SUBCULTURE IS NECESSARY TO DETERMINE PRESENCE OR ABSENCE OF ANAEROBIC BACTERIA IN THIS CULTURE. FURTHER REPORT TO FOLLOW AFTER INCUBATION OF SUBCULTURE.           CULTURE, BLOOD [[id number]] Collected:  04/26/20 1600    Order Status:  Completed Specimen:  Blood Updated:  05/01/20 0817     Special Requests: --        RIGHT  Antecubital       Culture result: NO GROWTH 5 DAYS       CULTURE, BLOOD [[id number]] Collected:  04/26/20 1607    Order Status:  Completed Specimen:  Blood Updated:  05/01/20 0817     Special Requests: --        LEFT  FOREARM       Culture result: NO GROWTH 5 DAYS       CULTURE, WOUND Gunnar Loupe STAIN [[id number]]  (Abnormal) Collected:  04/29/20 1157    Order Status:  Completed Specimen:  Wound from Ankle Updated:  05/01/20 0801 Special Requests: NO SPECIAL REQUESTS        GRAM STAIN 50  WBC'S/OIF      FEW GRAM POSITIVE COCCI        Culture result:       LIGHT STAPHYLOCOCCUS AUREUS                  CULTURE IN PROGRESS,FURTHER UPDATES TO FOLLOW          CULTURE, TISSUE Gunnar Loupe STAIN [[id number]]  (Abnormal) Collected:  04/29/20 1205    Order Status:  Completed Specimen:  Ankle Updated:  05/01/20 0758     Special Requests: NO SPECIAL REQUESTS        GRAM STAIN 5 TO 15 WBC'S/OIF      RARE GRAM POSITIVE COCCI        Culture result:       LIGHT STAPHYLOCOCCUS AUREUS SENSITIVITY TO FOLLOW          CULTURE, WOUND Gunnar Loupe STAIN [[id number]]  (Abnormal)  (Susceptibility) Collected:  04/26/20 1820    Order Status:  Completed Specimen:  Wound from Leg Updated:  04/29/20 1347     Special Requests: NO SPECIAL REQUESTS        GRAM STAIN 0 TO 1 WBC'S/OIF      NO EPITHELIAL CELLS SEEN               FEW GRAM POS COCCI IN CLUSTERS           Culture result:       HEAVY * METHICILLIN RESISTANT STAPHYLOCOCCUS AUREUS *                  RESULTS VERIFIED, PHONED TO AND READ BACK BY  PRISCILLA PIMENTEL RN ON 4/29/20 @1305, AMM            Imaging:     L ankle 4/29/2020  FINDINGS:  3 views of the left ankle are submitted for evaluation. Plain film assessment is  limited by portable technique, patient motion, and splint artifact. There  appears to be interval removal of prior metallic hardware stabilizing the  tibiofibular syndesmosis and distal fibula. Tiny faint residual metallic  densities are seen which were present on the prior examination. Additional  wire-like densities in gauge the mid tarsal bones which were present on the  prior examination. No acute appearing fracture line, or evidence for dislocation  is seen. Mild soft tissue gas is seen which is not clearly abnormal given the  recent surgery.  Persistent soft tissue edema is which may be improved although  this may be due to placement of a splint    Signed By: Lien Stevneson NP     May 1, 2020

## 2020-05-01 NOTE — DISCHARGE INSTRUCTIONS
Patient Education        Osteomyelitis: Care Instructions  Your Care Instructions  Osteomyelitis (say \"sp-zrim-nx-de-co-IQ-tus\") is a bone infection. It is caused by bacteria. The bacteria can infect the bone where it has been injured, or they can be carried through the blood from another area in the body. Osteomyelitis can be a short- or long-term problem. It is treated with antibiotics. You may get the antibiotics as pills or through a needle in a vein (IV). You will probably get treatment in the hospital at first. The type of treatment depends on the type of bacteria causing the infection, the bones affected, and how bad the infection is. Sometimes people need surgery to drain pus from bone or to fix damaged bone. Short-term osteomyelitis that is treated right away usually can be cured. But the long-term form sometimes comes back after treatment. You can help your chances of stopping the infection by taking your medicines as directed. Follow-up care is a key part of your treatment and safety. Be sure to make and go to all appointments, and call your doctor if you are having problems. It's also a good idea to know your test results and keep a list of the medicines you take. How can you care for yourself at home? · Take your antibiotics as directed. Do not stop taking them just because you feel better. You need to take the full course of antibiotics. · Take pain medicines exactly as directed. ? If the doctor gave you a prescription medicine for pain, take it as prescribed. ? If you are not taking a prescription pain medicine, ask your doctor if you can take an over-the-counter medicine. · Do mild exercise and stretching if your doctor says it is okay. This can help keep your bones and muscles healthy. Avoid strenuous work or exercise until your doctor says you can do it. · Consider physical therapy if your doctor suggests it. Physical therapy may help you have a normal range of movement. · Do not smoke. Smoking can slow healing of the infection. If you need help quitting, talk to your doctor about stop-smoking programs and medicines. These can increase your chances of quitting for good. When should you call for help? Call 911 anytime you think you may need emergency care. For example, call if:    · You have severe bone pain.    Call your doctor now or seek immediate medical care if:    · You continue to have bone pain.     · You have signs of infection, such as:  ? Increased pain, swelling, warmth, or redness. ? Red streaks leading from a wound. ? Pus draining from a wound. ? A fever.    Watch closely for changes in your health, and be sure to contact your doctor if:    · You do not get better as expected. Where can you learn more? Go to http://ishan-taylor.info/  Enter B364 in the search box to learn more about \"Osteomyelitis: Care Instructions. \"  Current as of: July 14, 2019Content Version: 12.4  © 4598-3136 [employer]. Care instructions adapted under license by [employer] (which disclaims liability or warranty for this information). If you have questions about a medical condition or this instruction, always ask your healthcare professional. [address] any warranty or liability for your use of this information.

## 2020-05-01 NOTE — DISCHARGE SUMMARY
Hospitalist Discharge Summary     Admit Date:  2020  3:49 PM   Name:  Ricky Castillo   Age:  75 y.o.  :  1955   MRN:  [id number]   PCP:  Amirah Krause MD  Treatment Team: Attending Provider: Rhonda Deutsch MD; Utilization Review: Carlitos Cluster; Care Manager: Keisha Abel LMSW; Consulting Provider: Mickie Shaikh MD; Primary Nurse: Jose Eduardo Schofield RN; Charge Nurse: Albino Talbert; Physical Therapist: Salas Wall PT    Problem List for this Hospitalization:  Hospital Problems as of 2020 Never Reviewed          Codes Class Noted - Resolved POA    * (Principal) Osteomyelitis ([facility] [address] ICD-10-CM: M86.9  ICD-9-CM: 730.20  2020 - Present Yes        Sepsis ([facility] [address] ICD-10-CM: A41.9  ICD-9-CM: 038.9, 995.91  2020 - Present Yes        Infected blister of left ankle ICD-10-CM: F84.118Z, L08.9  ICD-9-CM: 916.3  2020 - Present Yes        RESOLVED: Hallucinations ICD-10-CM: R44.3  ICD-9-CM: 780.1  2020 - 2020 Yes                Admission HPI from 2020:    \"Patient is a 75years old AA male, homeless man w/ no health care and on no medications with history of left ankle surgery after an accident in late , presents with non-healing wound at his left malleolus w/ some drainage and swelling of left lower leg that has been intermittent and flaring up intermittently since he had surgery. No fever or chills. Drainage he describes as pus/ bloody w/ no foul smell. In ED, he was hallucinating, seeing bugs. He states that he used to drink imported champagne and his last drink was 2 y ago. \"    Hospital Course:  Pt admitted with sepsis from osteo and infected hardware. s/p removal of hardware and debridement, wound CS grew MRSA. ID recommending dalbavancin. This is set up at infusion center. Pt wants to be discharged home (to car as he is homeless). He will go to infusion center for treatment.   Denies further needs    Final ID recs:  Impression:   · L ankle hardware-related infection, initial ORIF 8/2019 at [facility], now s/p hardware removal 4/29/2020, op cultures with staph aureus, pending; pre-op cultures MRSA  · Homelessness, living in his car     Plan:   · Reports plan to leave the hospital today regardless of medical advice.      · ID plan:   · Dalbavancin 1500 mg IV 5/2/2020 at infusion center through peripheral IV - to be placed and removed at time of infusion  · Dalbavancin 1500 mg IV 5/9/2020 at infusion center through peripheral IV - to be placed and removed at time of infusion  · ID follow up will be scheduled ~5/18/2020   · He will need wound care per surgery team.       Disposition: Home or Self Care  Activity: Activity as tolerated  Diet: DIET REGULAR  Code Status: Full Code    Follow Up Orders:  No orders of the defined types were placed in this encounter. Follow-up Information     Follow up With Specialties Details Why [id number] [facility]        Millard Boas, MD Internal Medicine On 5/18/2020 follow up [address]  [phone]            Discharge meds at bottom of this note. Plan was discussed with pt. All questions answered. Patient was stable at time of discharge. Given instructions to call a physician or return if any concerns. Discharge summary and encounter summary was sent to PCP electronically via \"Comm Mgt\" link in [facility], if possible. Diagnostic Imaging/Tests:   Xr Tib/fib Lt    Result Date: 4/26/2020  LEFT TIB-FIB 2 view(s). HISTORY: Cellulitis and wound. TECHNIQUE: AP and lateral views. COMPARISON: None. FINDINGS: There is a lateral plate and screws transfixing the distal fibula and distal tibia. No periostitis or erosions. There is soft tissue thickening. No soft tissue gas. IMPRESSION: Postop changes and soft tissue thickening. No periostitis. LEFT ANKLE 3 VIEW(S). HISTORY: Pain and swelling.   TECHNIQUE: AP and lateral and oblique views. COMPARISON: None. FINDINGS: Old healed distal fibular fracture. There is lateral plate with distal screws transfixing the fibula and tibia. There is overlying soft tissue swelling with tiny densities, possibly metallic. No periostitis. IMPRESSION: Soft tissue swelling. Postop changes. No periostitis. Xr Ankle Lt Min 3 V    Result Date: 4/29/2020  Left ankle radiographs 4/29/2020 CLINICAL HISTORY: Heart removal and incision/drainage. COMPARISON: Left ankle radiographs 4/26/2020 FINDINGS: 3 views of the left ankle are submitted for evaluation. Plain film assessment is limited by portable technique, patient motion, and splint artifact. There appears to be interval removal of prior metallic hardware stabilizing the tibiofibular syndesmosis and distal fibula. Tiny faint residual metallic densities are seen which were present on the prior examination. Additional wire-like densities in gauge the mid tarsal bones which were present on the prior examination. No acute appearing fracture line, or evidence for dislocation is seen. Mild soft tissue gas is seen which is not clearly abnormal given the recent surgery. Persistent soft tissue edema is which may be improved although this may be due to placement of a splint. IMPRESSION: 1. Hardware removal as described above. Xr Ankle Lt Min 3 V    Result Date: 4/26/2020  LEFT TIB-FIB 2 view(s). HISTORY: Cellulitis and wound. TECHNIQUE: AP and lateral views. COMPARISON: None. FINDINGS: There is a lateral plate and screws transfixing the distal fibula and distal tibia. No periostitis or erosions. There is soft tissue thickening. No soft tissue gas. IMPRESSION: Postop changes and soft tissue thickening. No periostitis. LEFT ANKLE 3 VIEW(S). HISTORY: Pain and swelling. TECHNIQUE: AP and lateral and oblique views. COMPARISON: None. FINDINGS: Old healed distal fibular fracture. There is lateral plate with distal screws transfixing the fibula and tibia. There is overlying soft tissue swelling with tiny densities, possibly metallic. No periostitis. IMPRESSION: Soft tissue swelling. Postop changes. No periostitis. Echocardiogram results:  No results found for this visit on 04/26/20. Procedures done this admission:  Procedure(s):  REMOVAL HARDWARE AND I & D LEFT ANKLE    All Micro Results     Procedure Component Value Units Date/Time    CULTURE, ANAEROBIC [[id number]] Collected:  04/29/20 1157    Order Status:  Completed Specimen:  Ankle Updated:  05/01/20 0927     Special Requests: NO SPECIAL REQUESTS        Culture result:       SUBCULTURE IS NECESSARY TO DETERMINE PRESENCE OR ABSENCE OF ANAEROBIC BACTERIA IN THIS CULTURE. FURTHER REPORT TO FOLLOW AFTER INCUBATION OF SUBCULTURE. CULTURE, ANAEROBIC [[id number]] Collected:  04/29/20 1205    Order Status:  Completed Specimen:  Ankle Updated:  05/01/20 0927     Special Requests: NO SPECIAL REQUESTS        Culture result:       SUBCULTURE IS NECESSARY TO DETERMINE PRESENCE OR ABSENCE OF ANAEROBIC BACTERIA IN THIS CULTURE. FURTHER REPORT TO FOLLOW AFTER INCUBATION OF SUBCULTURE.           CULTURE, BLOOD [[id number]] Collected:  04/26/20 1600    Order Status:  Completed Specimen:  Blood Updated:  05/01/20 0817     Special Requests: --        RIGHT  Antecubital       Culture result: NO GROWTH 5 DAYS       CULTURE, BLOOD [[id number]] Collected:  04/26/20 1607    Order Status:  Completed Specimen:  Blood Updated:  05/01/20 0817     Special Requests: --        LEFT  FOREARM       Culture result: NO GROWTH 5 DAYS       CULTURE, WOUND Teresa Pucker STAIN [[id number]]  (Abnormal) Collected:  04/29/20 1157    Order Status:  Completed Specimen:  Wound from Ankle Updated:  05/01/20 0801     Special Requests: NO SPECIAL REQUESTS        GRAM STAIN 50  WBC'S/OIF      FEW GRAM POSITIVE COCCI        Culture result:       LIGHT STAPHYLOCOCCUS AUREUS                  CULTURE IN PROGRESS,FURTHER UPDATES TO FOLLOW CULTURE, TISSUE Myrle Loach STAIN [[id number]]  (Abnormal) Collected:  04/29/20 1205    Order Status:  Completed Specimen:  Ankle Updated:  05/01/20 0758     Special Requests: NO SPECIAL REQUESTS        GRAM STAIN 5 TO 15 WBC'S/OIF      RARE GRAM POSITIVE COCCI        Culture result:       LIGHT STAPHYLOCOCCUS AUREUS SENSITIVITY TO FOLLOW          CULTURE, WOUND Myrle Loach STAIN [[id number]]  (Abnormal)  (Susceptibility) Collected:  04/26/20 1820    Order Status:  Completed Specimen:  Wound from Leg Updated:  04/29/20 1347     Special Requests: NO SPECIAL REQUESTS        GRAM STAIN 0 TO 1 WBC'S/OIF      NO EPITHELIAL CELLS SEEN               FEW GRAM POS COCCI IN CLUSTERS           Culture result:       HEAVY * METHICILLIN RESISTANT STAPHYLOCOCCUS AUREUS *                  RESULTS VERIFIED, PHONED TO AND READ BACK BY  PRISCILLA PIMENTEL RN ON 4/29/20 @1305, AMM            SARS-CoV-2 LAB Results  \"Novel Coronavirus\" Test: No results found for: COV2NT   \"Emergent Disease\" Test: No results found for: EDPR  As of: 10:30 AM on 5/1/2020      Labs: Results:       BMP, Mg, Phos No results for input(s): NA, K, CL, CO2, AGAP, BUN, CREA, CA, GLU, MG, PHOS in the last 72 hours. CBC No results for input(s): WBC, RBC, HGB, HCT, PLT, GRANS, LYMPH, EOS, MONOS, BASOS, IG, ANEU, ABL, ELVA, ABM, ABB, AIG, HGBEXT, HCTEXT, PLTEXT in the last 72 hours. LFT No results for input(s): SGOT, ALT, TBIL, AP, TP, ALB, GLOB, AGRAT, GPT in the last 72 hours.    Cardiac Testing No results found for: BNPP, BNP, CPK, RCK1, RCK2, RCK3, RCK4, CKMB, CKNDX, CKND1, TROPT, TROIQ   Coagulation Tests No results found for: PTP, INR, APTT, INREXT   A1c No results found for: HBA1C, HGBE8, EHM0FNDG   Lipid Panel No results found for: CHOL, CHOLPOCT, CHOLX, CHLST, CHOLV, 601637, HDL, HDLP, LDL, LDLC, DLDLP, 276012, VLDLC, VLDL, TGLX, TRIGL, TRIGP, TGLPOCT, CHHD, CHHDX   Thyroid Panel No results found for: TSH, T4, FT4, TT3, T3U, TSHEXT     Most Recent UA No results found for: COLOR, APPRN, REFSG, EMIGDIO, PROTU, GLUCU, KETU, BILU, BLDU, UROU, BETHEL, LEUKU, WBCU, RBCU, UEPI, BACTU, CASTS, UCRY, MUCUS, UCOM     No Known Allergies  Immunization History   Administered Date(s) Administered    TB Skin Test (PPD) Intradermal 04/30/2020       All Labs from Last 24 Hrs:  Recent Results (from the past 24 hour(s))   VANCOMYCIN, TROUGH    Collection Time: 05/01/20  2:17 AM   Result Value Ref Range    Vancomycin,trough 11.3 5 - 20 ug/mL       Discharge Exam:  Patient Vitals for the past 24 hrs:   Temp Pulse Resp BP SpO2   05/01/20 0734 98.4 °F (36.9 °C) 77 18 138/66 98 %   05/01/20 0402 99.2 °F (37.3 °C) 85 18 140/70 94 %   05/01/20 0025 98.5 °F (36.9 °C) 82 18 144/78 95 %   04/30/20 1929 98.7 °F (37.1 °C) 84 18 126/65 95 %   04/30/20 1600 99 °F (37.2 °C) 82 18 134/75 99 %   04/30/20 1133 98.2 °F (36.8 °C) 82 18 119/69 96 %     Oxygen Therapy  O2 Sat (%): 98 % (05/01/20 0734)  Pulse via Oximetry: 70 beats per minute (04/29/20 1330)  O2 Device: Room air (04/29/20 1643)  O2 Flow Rate (L/min): 3 l/min (04/29/20 1052)    Estimated body mass index is 23.75 kg/m² as calculated from the following:    Height as of this encounter: 6' 1\" (1.854 m). Weight as of this encounter: 81.6 kg (180 lb). No intake or output data in the 24 hours ending 05/01/20 1030    *Note that automatically entered I/Os may not be accurate; dependent on patient compliance with collection and accurate  by assistants. General:    Well nourished. Alert. Eyes:   Normal sclerae. Extraocular movements intact. ENT:  Normocephalic, atraumatic. Moist mucous membranes  CV:   Regular rate and rhythm. No murmur, rub, or gallop. Lungs:  Clear to auscultation bilaterally. No wheezing, rhonchi, or rales. Abdomen: Soft, nontender, nondistended. Extremities: Warm and dry. No cyanosis or edema. Neurologic: CN II-XII grossly intact. No gross focal deficits   Skin:     No rashes or jaundice.     Psych:  Normal mood and affect. Current Med List in Hospital:   Current Facility-Administered Medications   Medication Dose Route Frequency    vancomycin (VANCOCIN) 1250 mg in  ml infusion  1,250 mg IntraVENous Q12H    alcohol 62% (NOZIN) nasal  1 Ampule  1 Ampule Topical Q12H    tuberculin injection 5 Units  5 Units IntraDERMal ONCE    morphine 10 mg/ml injection 6 mg  6 mg IntraVENous Q4H PRN    oxyCODONE IR (ROXICODONE) tablet 10 mg  10 mg Oral Q3H PRN    sodium chloride (NS) flush 5-40 mL  5-40 mL IntraVENous Q8H    sodium chloride (NS) flush 5-40 mL  5-40 mL IntraVENous PRN    acetaminophen (TYLENOL) tablet 650 mg  650 mg Oral Q4H PRN    naloxone (NARCAN) injection 0.4 mg  0.4 mg IntraVENous PRN    ondansetron (ZOFRAN) injection 4 mg  4 mg IntraVENous Q4H PRN    magnesium hydroxide (MILK OF MAGNESIA) 400 mg/5 mL oral suspension 30 mL  30 mL Oral DAILY PRN    nicotine (NICODERM CQ) 21 mg/24 hr patch 1 Patch  1 Patch TransDERmal DAILY PRN    thiamine HCL (B-1) tablet 100 mg  100 mg Oral DAILY       Discharge Info: There are no discharge medications for this patient. Time spent in patient discharge planning and coordination 35 minutes.     Signed:  Shane Ho MD

## 2020-05-01 NOTE — PROGRESS NOTES
Pt decided that he did not want to go to SNF for rehab but would prefer to dc to his \"home\" (Car). Pt is medically cleared for discharge today. IV ABX infusions will be done at the [address]. First appt is tomorrow at 130pm.  Appointment info placed into pt's AVS.  No wound care needed as surgeon does not want splint or dressing removed until his follow up appt. SW contacted Dr. Nadja Schofield re: follow up in his office. He instructed SW to have the pt come to his office on Thursday, 5/7/2020, at 9:00am.  This information was placed into pt's AVS.  Pt was provided with a rolling walker through [facility]  No other dc needs or concerns identified or reported. Care Management Interventions  Mode of Transport at Discharge: Self  Transition of Care Consult (CM Consult): Infusion Center  Partner SNF: Yes  Discharge Durable Medical Equipment: Yes(fixed wheel rolling walker from [facility])  Physical Therapy Consult: Yes  Occupational Therapy Consult: No  Speech Therapy Consult: No  Current Support Network: Lives Alone, Other(stays in his car)  Confirm Follow Up Transport: Self  The Plan for Transition of Care is Related to the Following Treatment Goals :  Outpatient infusion services for IV ABX  The Patient and/or Patient Representative was Provided with a Choice of Provider and Agrees with the Discharge Plan?: Yes  Discharge Location  Discharge Placement: Home with infusion therapy([facility])

## 2020-05-01 NOTE — PROGRESS NOTES
Problem: Mobility Impaired (Adult and Pediatric)  Goal: *Acute Goals and Plan of Care (Insert Text)  Description: DISCHARGE GOALS :  (1.)Mr. Lj Teixeira will move from supine to sit and sit to supine  with SUPERVISION. (2.)Mr. Lj Teixeira will transfer from bed to chair and chair to bed with STAND BY ASSIST using a walker. (3.)Mr. Lj Teixeira will ambulate with STAND BY ASSIST for 50-75 feet with a rolling walker. 4) pt able to perform both UE & right LE AROM safely with written guidelines. Outcome: Progressing Towards Goal     PHYSICAL THERAPY: Initial Assessment 5/1/2020  INPATIENT: PT Visit Days : 1  [place] / [place] / Product Type: Medicare /       NAME/AGE/GENDER: Arian Everett is a 75 y.o. male   PRIMARY DIAGNOSIS: Infected blister of left ankle [S90.522A, L08.9]  Osteomyelitis (HCC) [M86.9] Osteomyelitis ([facility] [address] Osteomyelitis (HCC)  Procedure(s) (LRB):  REMOVAL HARDWARE AND I & D LEFT ANKLE (Left)  2 Days Post-Op  ICD-10: Treatment Diagnosis:    Generalized Muscle Weakness (M62.81)  Other lack of cordination (R27.8)  Difficulty in walking, Not elsewhere classified (R26.2)  Other abnormalities of gait and mobility (R26.89)   Precaution/Allergies:  Patient has no known allergies. ASSESSMENT:     Mr. Lj Teixeira presents with general weakness & left lower leg discomfort due to removal of left ankle hardware with onset of osteomyelitis. This pt is homeless currently, was at [facility] prior to this admission but will need SNF on hospital DC. PT will follow up while pt is in house to hopefully improve pt's level of independence prior to his transfer to SNF.     This section established at most recent assessment   PROBLEM LIST (Impairments causing functional limitations):  Decreased Strength  Decreased ADL/Functional Activities  Decreased Transfer Abilities  Decreased Ambulation Ability/Technique  Decreased Balance  Increased Pain  Decreased Activity Tolerance  Decreased Flexibility/Joint Mobility  Decreased [place] with Home Exercise Program   INTERVENTIONS PLANNED: (Benefits and precautions of physical therapy have been discussed with the patient.)  Balance Exercise  Bed Mobility  Gait Training  Home Exercise Program (HEP)  Therapeutic Activites  Therapeutic Exercise/Strengthening  Transfer Training     TREATMENT PLAN: Frequency/Duration: daily for duration of hospital stay  Rehabilitation Potential For Stated Goals: Good     REHAB RECOMMENDATIONS (at time of discharge pending progress):    Placement: It is my opinion, based on this patient's performance to date, that Mr. Lj Teixeira may benefit from intensive therapy at a [address] [place] [address] after discharge due to the functional deficits listed above that are likely to improve with skilled rehabilitation and concerns that he/she may be unsafe to be unsupervised at home due to his debility. Equipment:   Rolling walker with fixed wheels               HISTORY:   History of Present Injury/Illness (Reason for Referral):  His left lower extremity shows an open wound in line with his previous incision which is consistent with his open reduction internal fixation of his left lateral malleolus. There is some purulent drainage from this area. He is very tender to palpation but withdraws even with light touch throughout his entire left lower extremity. Is difficult to get a detailed neurologic examination but his sensation appears to be grossly intact. Is also difficult to have him hold still long enough to really see if he has intact pulses but his capillary refill is intact. Past Medical History/Comorbidities:   Mr. Lj Teixeira  has no past medical history on file. Mr. Lj Teixeira  has no past surgical history on file.   Social History/Living Environment:   Home Environment: (homeless)  [address] Name: [employer]  # Steps to Enter: 0  One/Two Story Residence: One story  Living Alone: Yes  Support Systems: (none)  Patient Expects to be Discharged to[de-identified] (SNF)  Current DME Used/Available at Home: (none)  Prior Level of Function/Work/Activity:  Pt was functioning without an assistive device prior tot his admission. Personal Factors: Other factors that influence how disability is experienced by the patient:  current & PMH   Number of Personal Factors/Comorbidities that affect the Plan of Care: 1-2: MODERATE COMPLEXITY   EXAMINATION:   Most Recent Physical Functioning:   Gross Assessment:  AROM: Generally decreased, functional(right LE)  Strength: Generally decreased, functional( )  Coordination: Generally decreased, functional( )                  Balance:  Sitting: Intact; Without support  Standing: Impaired; With support Bed Mobility:  Supine to Sit: Contact guard assistance  Sit to Supine: (NT)  Scooting: Contact guard assistance       Transfers:  Sit to Stand: Minimum assistance  Stand to Sit: Minimum assistance  Bed to Chair: Minimum assistance(with walker)  Gait:  Left Side Weight Bearing: Non-weight bearing  Speed/Marla: Delayed  Step Length: Right shortened  Gait Abnormalities: Decreased step clearance;Trunk sway increased  Distance (ft): 30 Feet (ft)  Assistive Device: Walker, rolling  Ambulation - Level of Assistance: Minimal assistance   Functional Mobility:         Gait/Ambulation:  min        Transfers:  min        Bed Mobility:  cga   Body Structures Involved:  Joints  Muscles Body Functions Affected:  Sensory/Pain  Movement Related Activities and Participation Affected:  General Tasks and Demands  Mobility   Number of elements that affect the Plan of Care: 4+: HIGH COMPLEXITY   CLINICAL PRESENTATION:   Presentation: Evolving clinical presentation with changing clinical characteristics: MODERATE COMPLEXITY   CLINICAL DECISION MAKIN [facility] [place] [address] [address] AM-PAC 6 Clicks   Basic Mobility Inpatient Short Form  How much difficulty does the patient currently have. .. Unable A Lot A Little None   1. Turning over in bed (including adjusting bedclothes, sheets and blankets)? [] 1   [] 2   [x] 3   [] 4   2. Sitting down on and standing up from a chair with arms ( e.g., wheelchair, bedside commode, etc.)   [] 1   [] 2   [x] 3   [] 4   3. Moving from lying on back to sitting on the side of the bed? [] 1   [] 2   [x] 3   [] 4   How much help from another person does the patient currently need. .. Total A Lot A Little None   4. Moving to and from a bed to a chair (including a wheelchair)? [] 1   [] 2   [x] 3   [] 4   5. Need to walk in hospital room? [] 1   [] 2   [x] 3   [] 4   6. Climbing 3-5 steps with a railing? [x] 1   [] 2   [] 3   [] 4   © 2007, Trustees of [address] [place] [address], under license to [employer]. All rights reserved      Score:  Initial: 16 Most Recent: X (Date: -- )    Interpretation of Tool:  Represents activities that are increasingly more difficult (i.e. Bed mobility, Transfers, Gait). Medical Necessity:     Patient is expected to demonstrate progress in   strength, balance, coordination, and functional technique   to   decrease assistance required with bed mobility, transfers & gait   . Reason for Services/Other Comments:  Patient continues to require skilled intervention due to   Pt unsafe with functional mobility   .    Use of outcome tool(s) and clinical judgement create a POC that gives a: Questionable prediction of patient's progress: MODERATE COMPLEXITY            TREATMENT:   (In addition to Assessment/Re-Assessment sessions the following treatments were rendered)   Pre-treatment Symptoms/Complaints:  \" I need to take care of business, I need to leave \"   Pain: Initial: numeric scale  Pain Intensity 1: 3  Pain Location 1: Ankle  Pain Orientation 1: Left  Pain Intervention(s) 1: Repositioned  Post Session:  3/10     Therapeutic Exercise: (12 Minutes):  Exercises : warm up AROM - both UE's & right LE to improve mobility and dynamic movement of arm - bilateral and leg - right to improve functional endurance . Required minimal verbal and manual cues to promote proper body alignment and promote proper body mechanics. Assessment/11 min    Braces/Orthotics/Lines/Etc:   IV  Treatment/Session Assessment:    Response to Treatment:  tolerated well but impulsive  Interdisciplinary Collaboration:   Registered Nurse    After treatment position/precautions:   Up in chair  Bed alarm/tab alert on  Bed/Chair-wheels locked  Call light within reach  RN notified   Compliance with Program/Exercises: Will assess as treatment progresses  Recommendations/Intent for next treatment session: \"Next visit will focus on reduction in assistance provided\".   Total Treatment Duration:  PT Patient Time In/Time Out  Time In: 0921  Time Out: [address], PT

## 2020-05-01 NOTE — PROGRESS NOTES
Pt has been accepted for STR admission to [facility]  and can transfer there when medically cleared for discharge. SW following to facilitate pt's transfer at that time.

## 2020-05-02 ENCOUNTER — HOSPITAL ENCOUNTER (OUTPATIENT)
Dept: INFUSION THERAPY | Age: 65
Discharge: HOME | End: 2020-05-02
Payer: MEDICARE

## 2020-05-02 VITALS
DIASTOLIC BLOOD PRESSURE: 84 MMHG | OXYGEN SATURATION: 98 % | SYSTOLIC BLOOD PRESSURE: 153 MMHG | HEART RATE: 86 BPM | TEMPERATURE: 97.8 F | RESPIRATION RATE: 18 BRPM

## 2020-05-02 LAB
BACTERIA SPEC CULT: ABNORMAL
GRAM STN SPEC: ABNORMAL
SERVICE CMNT-IMP: ABNORMAL
SERVICE CMNT-IMP: ABNORMAL

## 2020-05-02 PROCEDURE — 74011250636 HC RX REV CODE- 250/636: Performed by: NURSE PRACTITIONER

## 2020-05-02 PROCEDURE — 96365 THER/PROPH/DIAG IV INF INIT: CPT

## 2020-05-02 RX ADMIN — DALBAVANCIN 1500 MG: 500 INJECTION, POWDER, FOR SOLUTION INTRAVENOUS at 14:58

## 2020-05-02 NOTE — PROGRESS NOTES
Arrived to the [facility]. Assessment complete. [place] completed. Patient tolerated without problems. Any issues or concerns during appointment: None. Patient aware of next infusion appointment on 5/9/20(date) at 1 30 PM (time).   Discharged via W/C

## 2020-05-06 LAB
BACTERIA SPEC CULT: NORMAL
BACTERIA SPEC CULT: NORMAL
SERVICE CMNT-IMP: NORMAL
SERVICE CMNT-IMP: NORMAL

## 2020-05-09 ENCOUNTER — HOSPITAL ENCOUNTER (OUTPATIENT)
Dept: INFUSION THERAPY | Age: 65
Discharge: HOME | End: 2020-05-09
Payer: MEDICARE

## 2020-05-09 VITALS
OXYGEN SATURATION: 99 % | TEMPERATURE: 98.3 F | DIASTOLIC BLOOD PRESSURE: 82 MMHG | RESPIRATION RATE: 18 BRPM | SYSTOLIC BLOOD PRESSURE: 150 MMHG | HEART RATE: 74 BPM

## 2020-05-09 PROCEDURE — 74011250636 HC RX REV CODE- 250/636: Performed by: NURSE PRACTITIONER

## 2020-05-09 PROCEDURE — 96413 CHEMO IV INFUSION 1 HR: CPT

## 2020-05-09 PROCEDURE — 96365 THER/PROPH/DIAG IV INF INIT: CPT

## 2020-05-09 RX ORDER — DEXTROSE MONOHYDRATE 50 MG/ML
25 INJECTION, SOLUTION INTRAVENOUS CONTINUOUS
Status: DISCONTINUED | OUTPATIENT
Start: 2020-05-09 | End: 2020-05-13 | Stop reason: HOSPADM

## 2020-05-09 RX ORDER — SODIUM CHLORIDE 0.9 % (FLUSH) 0.9 %
10 SYRINGE (ML) INJECTION AS NEEDED
Status: DISCONTINUED | OUTPATIENT
Start: 2020-05-09 | End: 2020-05-13 | Stop reason: HOSPADM

## 2020-05-09 RX ADMIN — Medication 10 ML: at 16:35

## 2020-05-09 RX ADMIN — Medication 10 ML: at 17:42

## 2020-05-09 RX ADMIN — DALBAVANCIN 1500 MG: 500 INJECTION, POWDER, FOR SOLUTION INTRAVENOUS at 17:00

## 2020-05-09 RX ADMIN — DEXTROSE MONOHYDRATE 25 ML/HR: 5 INJECTION, SOLUTION INTRAVENOUS at 16:58

## 2020-05-09 NOTE — PROGRESS NOTES
Arrived to the [facility]. Fredyce infusion completed. Patient tolerated well. Any issues or concerns during appointment: no.  Patient does not have any future infusion appointments at this time. Discharged via wheelchair.

## 2020-05-09 NOTE — PROGRESS NOTES
Patient called ED stating that he was outside Dr. Ailyn Mosley office and was scheduled for an appointment today. Call forwarded to [address] [facility] looked through patient's chart, learned that he has an appointment today at the [facility] on [address] at 0:42 pm for a shot. SW also spoke with the patient original SW from [facility] to confirm this information. Patient advised on appointment date, time, and location. Patient advised to call to reschedule his shot or see if he can still be seen. Patient verbalized understanding and agreement.      Juan C Hunter, [facility]    [facility]    * [email or web address]    ( [phone]

## 2020-05-21 ENCOUNTER — APPOINTMENT (OUTPATIENT)
Dept: GENERAL RADIOLOGY | Age: 65
DRG: 858 | End: 2020-05-21
Attending: PHYSICIAN ASSISTANT
Payer: MEDICARE

## 2020-05-21 ENCOUNTER — HOSPITAL ENCOUNTER (INPATIENT)
Age: 65
LOS: 5 days | Discharge: LEFT BEFORE DISCHARGE | DRG: 858 | End: 2020-05-26
Attending: ORTHOPAEDIC SURGERY | Admitting: ORTHOPAEDIC SURGERY
Payer: MEDICARE

## 2020-05-21 ENCOUNTER — ANESTHESIA EVENT (OUTPATIENT)
Dept: SURGERY | Age: 65
DRG: 858 | End: 2020-05-21
Payer: MEDICARE

## 2020-05-21 PROBLEM — T81.49XA POSTOPERATIVE WOUND INFECTION: Status: ACTIVE | Noted: 2020-05-21

## 2020-05-21 PROBLEM — T81.42XA DEEP POSTOPERATIVE WOUND INFECTION: Status: ACTIVE | Noted: 2020-05-21

## 2020-05-21 LAB
ANION GAP SERPL CALC-SCNC: 6 MMOL/L (ref 7–16)
BUN SERPL-MCNC: 14 MG/DL (ref 8–23)
CALCIUM SERPL-MCNC: 9.3 MG/DL (ref 8.3–10.4)
CHLORIDE SERPL-SCNC: 108 MMOL/L (ref 98–107)
CO2 SERPL-SCNC: 30 MMOL/L (ref 21–32)
CREAT SERPL-MCNC: 1.02 MG/DL (ref 0.8–1.5)
ERYTHROCYTE [DISTWIDTH] IN BLOOD BY AUTOMATED COUNT: 15.3 % (ref 11.9–14.6)
GLUCOSE SERPL-MCNC: 92 MG/DL (ref 65–100)
HCT VFR BLD AUTO: 31.8 % (ref 41.1–50.3)
HGB BLD-MCNC: 10.2 G/DL (ref 13.6–17.2)
MCH RBC QN AUTO: 27 PG (ref 26.1–32.9)
MCHC RBC AUTO-ENTMCNC: 32.1 G/DL (ref 31.4–35)
MCV RBC AUTO: 84.1 FL (ref 79.6–97.8)
NRBC # BLD: 0 K/UL (ref 0–0.2)
PLATELET # BLD AUTO: 193 K/UL (ref 150–450)
PMV BLD AUTO: 10.9 FL (ref 9.4–12.3)
POTASSIUM SERPL-SCNC: 3.4 MMOL/L (ref 3.5–5.1)
RBC # BLD AUTO: 3.78 M/UL (ref 4.23–5.6)
SODIUM SERPL-SCNC: 144 MMOL/L (ref 136–145)
WBC # BLD AUTO: 5.5 K/UL (ref 4.3–11.1)

## 2020-05-21 PROCEDURE — 80048 BASIC METABOLIC PNL TOTAL CA: CPT

## 2020-05-21 PROCEDURE — 86580 TB INTRADERMAL TEST: CPT | Performed by: PHYSICIAN ASSISTANT

## 2020-05-21 PROCEDURE — 85027 COMPLETE CBC AUTOMATED: CPT

## 2020-05-21 PROCEDURE — 36415 COLL VENOUS BLD VENIPUNCTURE: CPT

## 2020-05-21 PROCEDURE — 74011000302 HC RX REV CODE- 302: Performed by: PHYSICIAN ASSISTANT

## 2020-05-21 PROCEDURE — 73590 X-RAY EXAM OF LOWER LEG: CPT

## 2020-05-21 PROCEDURE — 74011250637 HC RX REV CODE- 250/637: Performed by: ORTHOPAEDIC SURGERY

## 2020-05-21 PROCEDURE — 65270000029 HC RM PRIVATE

## 2020-05-21 PROCEDURE — 81003 URINALYSIS AUTO W/O SCOPE: CPT

## 2020-05-21 RX ORDER — SODIUM CHLORIDE 0.9 % (FLUSH) 0.9 %
5-40 SYRINGE (ML) INJECTION EVERY 8 HOURS
Status: DISCONTINUED | OUTPATIENT
Start: 2020-05-21 | End: 2020-05-26 | Stop reason: HOSPADM

## 2020-05-21 RX ORDER — AMOXICILLIN 250 MG
1 CAPSULE ORAL DAILY
Status: DISCONTINUED | OUTPATIENT
Start: 2020-05-22 | End: 2020-05-26 | Stop reason: HOSPADM

## 2020-05-21 RX ORDER — SODIUM CHLORIDE 0.9 % (FLUSH) 0.9 %
5-40 SYRINGE (ML) INJECTION AS NEEDED
Status: DISCONTINUED | OUTPATIENT
Start: 2020-05-21 | End: 2020-05-26 | Stop reason: HOSPADM

## 2020-05-21 RX ORDER — ACETAMINOPHEN 325 MG/1
650 TABLET ORAL
Status: DISCONTINUED | OUTPATIENT
Start: 2020-05-21 | End: 2020-05-26 | Stop reason: HOSPADM

## 2020-05-21 RX ORDER — HYDROCODONE BITARTRATE AND ACETAMINOPHEN 7.5; 325 MG/1; MG/1
1 TABLET ORAL
Status: DISCONTINUED | OUTPATIENT
Start: 2020-05-21 | End: 2020-05-26 | Stop reason: HOSPADM

## 2020-05-21 RX ORDER — HYDROMORPHONE HYDROCHLORIDE 1 MG/ML
1 INJECTION, SOLUTION INTRAMUSCULAR; INTRAVENOUS; SUBCUTANEOUS
Status: DISCONTINUED | OUTPATIENT
Start: 2020-05-21 | End: 2020-05-26 | Stop reason: HOSPADM

## 2020-05-21 RX ORDER — NALOXONE HYDROCHLORIDE 0.4 MG/ML
0.4 INJECTION, SOLUTION INTRAMUSCULAR; INTRAVENOUS; SUBCUTANEOUS AS NEEDED
Status: DISCONTINUED | OUTPATIENT
Start: 2020-05-21 | End: 2020-05-26 | Stop reason: HOSPADM

## 2020-05-21 RX ORDER — DIPHENHYDRAMINE HYDROCHLORIDE 50 MG/ML
12.5 INJECTION, SOLUTION INTRAMUSCULAR; INTRAVENOUS
Status: DISCONTINUED | OUTPATIENT
Start: 2020-05-21 | End: 2020-05-26 | Stop reason: HOSPADM

## 2020-05-21 RX ADMIN — Medication 1 AMPULE: at 21:14

## 2020-05-21 RX ADMIN — Medication 5 ML: at 21:18

## 2020-05-21 RX ADMIN — TUBERCULIN PURIFIED PROTEIN DERIVATIVE 5 UNITS: 5 INJECTION, SOLUTION INTRADERMAL at 19:46

## 2020-05-21 NOTE — PROGRESS NOTES
05/21/20 1843   Dual Skin Pressure Injury Assessment   Dual Skin Pressure Injury Assessment WDL   Second Care Provider (Based on [address] [place] [address]) Kathy Paez RN   Skin Integumentary   Skin Integumentary (WDL) X   Skin Integrity Intact;Scars (comment)  (scattered)

## 2020-05-21 NOTE — ANESTHESIA PREPROCEDURE EVALUATION
Relevant Problems   No relevant active problems       Anesthetic History   No history of anesthetic complications            Review of Systems / Medical History  Patient summary reviewed and pertinent labs reviewed    Pulmonary          Smoker         Neuro/Psych       CVA: no residual symptoms       Cardiovascular    Hypertension              Exercise tolerance: >4 METS  Comments: EF preserved in 2016 during stress echo in [facility]. GI/Hepatic/Renal  Within defined limits              Endo/Other  Within defined limits           Other Findings   Comments: Ankle Infection chronic. Last discharge 4/30/20    Homeless. Patient is difficult to interview due to loose associations.   Patient takes significant issue with unknown LOS in the past.         Physical Exam    Airway  Mallampati: II  TM Distance: 4 - 6 cm  Neck ROM: normal range of motion   Mouth opening: Normal     Cardiovascular    Rhythm: regular  Rate: normal         Dental  No notable dental hx       Pulmonary  Breath sounds clear to auscultation               Abdominal  GI exam deferred       Other Findings            Anesthetic Plan    ASA: 2  Anesthesia type: general          Induction: Intravenous  Anesthetic plan and risks discussed with: Patient

## 2020-05-22 ENCOUNTER — ANESTHESIA (OUTPATIENT)
Dept: SURGERY | Age: 65
DRG: 858 | End: 2020-05-22
Payer: MEDICARE

## 2020-05-22 PROBLEM — Z91.199 NON-COMPLIANT PATIENT: Status: ACTIVE | Noted: 2020-05-22

## 2020-05-22 PROBLEM — I10 HTN (HYPERTENSION): Status: ACTIVE | Noted: 2020-05-22

## 2020-05-22 LAB
APPEARANCE UR: ABNORMAL
BACTERIA URNS QL MICRO: 0 /HPF
BILIRUB UR QL: NEGATIVE
CASTS URNS QL MICRO: ABNORMAL /LPF
COLOR UR: ABNORMAL
EPI CELLS #/AREA URNS HPF: ABNORMAL /HPF
GLUCOSE UR STRIP.AUTO-MCNC: NEGATIVE MG/DL
HGB UR QL STRIP: NEGATIVE
KETONES UR QL STRIP.AUTO: NEGATIVE MG/DL
LEUKOCYTE ESTERASE UR QL STRIP.AUTO: NEGATIVE
MM INDURATION POC: 0 MM (ref 0–5)
NITRITE UR QL STRIP.AUTO: NEGATIVE
PH UR STRIP: 5.5 [PH] (ref 5–9)
PPD POC: NEGATIVE NEGATIVE
PROT UR STRIP-MCNC: ABNORMAL MG/DL
RBC #/AREA URNS HPF: ABNORMAL /HPF
SP GR UR REFRACTOMETRY: 1.03 (ref 1–1.02)
UROBILINOGEN UR QL STRIP.AUTO: 0.2 EU/DL (ref 0.2–1)
WBC URNS QL MICRO: ABNORMAL /HPF

## 2020-05-22 PROCEDURE — 77030019908 HC STETH ESOPH SIMS -A: Performed by: ANESTHESIOLOGY

## 2020-05-22 PROCEDURE — 76060000032 HC ANESTHESIA 0.5 TO 1 HR: Performed by: ORTHOPAEDIC SURGERY

## 2020-05-22 PROCEDURE — 74011250636 HC RX REV CODE- 250/636: Performed by: NURSE ANESTHETIST, CERTIFIED REGISTERED

## 2020-05-22 PROCEDURE — 74011250637 HC RX REV CODE- 250/637: Performed by: INTERNAL MEDICINE

## 2020-05-22 PROCEDURE — 77030018836 HC SOL IRR NACL ICUM -A: Performed by: ORTHOPAEDIC SURGERY

## 2020-05-22 PROCEDURE — 0QBK0ZZ EXCISION OF LEFT FIBULA, OPEN APPROACH: ICD-10-PCS | Performed by: ORTHOPAEDIC SURGERY

## 2020-05-22 PROCEDURE — 77030017016 HC DSG ANTIMIC BARR2 S&N -B: Performed by: ORTHOPAEDIC SURGERY

## 2020-05-22 PROCEDURE — 74011250636 HC RX REV CODE- 250/636: Performed by: ANESTHESIOLOGY

## 2020-05-22 PROCEDURE — 74011250637 HC RX REV CODE- 250/637: Performed by: ORTHOPAEDIC SURGERY

## 2020-05-22 PROCEDURE — 65270000029 HC RM PRIVATE

## 2020-05-22 PROCEDURE — 76010000138 HC OR TIME 0.5 TO 1 HR: Performed by: ORTHOPAEDIC SURGERY

## 2020-05-22 PROCEDURE — 77030013708 HC HNDPC SUC IRR PULS STRY –B: Performed by: ORTHOPAEDIC SURGERY

## 2020-05-22 PROCEDURE — 74011000250 HC RX REV CODE- 250: Performed by: NURSE ANESTHETIST, CERTIFIED REGISTERED

## 2020-05-22 PROCEDURE — 76210000063 HC OR PH I REC FIRST 0.5 HR: Performed by: ORTHOPAEDIC SURGERY

## 2020-05-22 PROCEDURE — 77030010509 HC AIRWY LMA MSK TELE -A: Performed by: ANESTHESIOLOGY

## 2020-05-22 PROCEDURE — 74011250637 HC RX REV CODE- 250/637: Performed by: PHYSICIAN ASSISTANT

## 2020-05-22 RX ORDER — LIDOCAINE HYDROCHLORIDE 10 MG/ML
0.1 INJECTION INFILTRATION; PERINEURAL AS NEEDED
Status: DISCONTINUED | OUTPATIENT
Start: 2020-05-22 | End: 2020-05-26 | Stop reason: HOSPADM

## 2020-05-22 RX ORDER — ALBUTEROL SULFATE 0.83 MG/ML
2.5 SOLUTION RESPIRATORY (INHALATION) AS NEEDED
Status: DISCONTINUED | OUTPATIENT
Start: 2020-05-22 | End: 2020-05-22 | Stop reason: HOSPADM

## 2020-05-22 RX ORDER — HYDROMORPHONE HYDROCHLORIDE 2 MG/ML
0.5 INJECTION, SOLUTION INTRAMUSCULAR; INTRAVENOUS; SUBCUTANEOUS
Status: DISCONTINUED | OUTPATIENT
Start: 2020-05-22 | End: 2020-05-22 | Stop reason: HOSPADM

## 2020-05-22 RX ORDER — OXYCODONE HYDROCHLORIDE 5 MG/1
5 TABLET ORAL
Status: DISCONTINUED | OUTPATIENT
Start: 2020-05-22 | End: 2020-05-22 | Stop reason: HOSPADM

## 2020-05-22 RX ORDER — MIDAZOLAM HYDROCHLORIDE 1 MG/ML
2 INJECTION, SOLUTION INTRAMUSCULAR; INTRAVENOUS ONCE
Status: DISPENSED | OUTPATIENT
Start: 2020-05-22 | End: 2020-05-22

## 2020-05-22 RX ORDER — CHLORTHALIDONE 25 MG/1
50 TABLET ORAL DAILY
Status: DISCONTINUED | OUTPATIENT
Start: 2020-05-22 | End: 2020-05-24

## 2020-05-22 RX ORDER — HYDRALAZINE HYDROCHLORIDE 20 MG/ML
20 INJECTION INTRAMUSCULAR; INTRAVENOUS
Status: DISCONTINUED | OUTPATIENT
Start: 2020-05-22 | End: 2020-05-26 | Stop reason: HOSPADM

## 2020-05-22 RX ORDER — EPHEDRINE SULFATE/0.9% NACL/PF 50 MG/5 ML
SYRINGE (ML) INTRAVENOUS AS NEEDED
Status: DISCONTINUED | OUTPATIENT
Start: 2020-05-22 | End: 2020-05-22 | Stop reason: HOSPADM

## 2020-05-22 RX ORDER — SODIUM CHLORIDE, SODIUM LACTATE, POTASSIUM CHLORIDE, CALCIUM CHLORIDE 600; 310; 30; 20 MG/100ML; MG/100ML; MG/100ML; MG/100ML
75 INJECTION, SOLUTION INTRAVENOUS CONTINUOUS
Status: DISPENSED | OUTPATIENT
Start: 2020-05-22 | End: 2020-05-23

## 2020-05-22 RX ORDER — SODIUM CHLORIDE, SODIUM LACTATE, POTASSIUM CHLORIDE, CALCIUM CHLORIDE 600; 310; 30; 20 MG/100ML; MG/100ML; MG/100ML; MG/100ML
50 INJECTION, SOLUTION INTRAVENOUS CONTINUOUS
Status: DISCONTINUED | OUTPATIENT
Start: 2020-05-22 | End: 2020-05-22 | Stop reason: HOSPADM

## 2020-05-22 RX ORDER — FENTANYL CITRATE 50 UG/ML
INJECTION, SOLUTION INTRAMUSCULAR; INTRAVENOUS AS NEEDED
Status: DISCONTINUED | OUTPATIENT
Start: 2020-05-22 | End: 2020-05-22 | Stop reason: HOSPADM

## 2020-05-22 RX ORDER — MIDAZOLAM HYDROCHLORIDE 1 MG/ML
2 INJECTION, SOLUTION INTRAMUSCULAR; INTRAVENOUS
Status: DISPENSED | OUTPATIENT
Start: 2020-05-22 | End: 2020-05-23

## 2020-05-22 RX ORDER — HYDROCODONE BITARTRATE AND ACETAMINOPHEN 7.5; 325 MG/1; MG/1
2 TABLET ORAL
Status: CANCELLED | OUTPATIENT
Start: 2020-05-22

## 2020-05-22 RX ORDER — FENTANYL CITRATE 50 UG/ML
100 INJECTION, SOLUTION INTRAMUSCULAR; INTRAVENOUS ONCE
Status: DISPENSED | OUTPATIENT
Start: 2020-05-22 | End: 2020-05-22

## 2020-05-22 RX ORDER — LIDOCAINE HYDROCHLORIDE 20 MG/ML
INJECTION, SOLUTION EPIDURAL; INFILTRATION; INTRACAUDAL; PERINEURAL AS NEEDED
Status: DISCONTINUED | OUTPATIENT
Start: 2020-05-22 | End: 2020-05-22 | Stop reason: HOSPADM

## 2020-05-22 RX ORDER — SODIUM CHLORIDE, SODIUM LACTATE, POTASSIUM CHLORIDE, CALCIUM CHLORIDE 600; 310; 30; 20 MG/100ML; MG/100ML; MG/100ML; MG/100ML
INJECTION, SOLUTION INTRAVENOUS
Status: DISCONTINUED | OUTPATIENT
Start: 2020-05-22 | End: 2020-05-22 | Stop reason: HOSPADM

## 2020-05-22 RX ORDER — DEXAMETHASONE SODIUM PHOSPHATE 4 MG/ML
INJECTION, SOLUTION INTRA-ARTICULAR; INTRALESIONAL; INTRAMUSCULAR; INTRAVENOUS; SOFT TISSUE AS NEEDED
Status: DISCONTINUED | OUTPATIENT
Start: 2020-05-22 | End: 2020-05-22 | Stop reason: HOSPADM

## 2020-05-22 RX ORDER — PROPOFOL 10 MG/ML
INJECTION, EMULSION INTRAVENOUS AS NEEDED
Status: DISCONTINUED | OUTPATIENT
Start: 2020-05-22 | End: 2020-05-22 | Stop reason: HOSPADM

## 2020-05-22 RX ORDER — ONDANSETRON 2 MG/ML
INJECTION INTRAMUSCULAR; INTRAVENOUS AS NEEDED
Status: DISCONTINUED | OUTPATIENT
Start: 2020-05-22 | End: 2020-05-22 | Stop reason: HOSPADM

## 2020-05-22 RX ADMIN — ONDANSETRON 4 MG: 2 INJECTION INTRAMUSCULAR; INTRAVENOUS at 10:50

## 2020-05-22 RX ADMIN — Medication 5 ML: at 05:42

## 2020-05-22 RX ADMIN — Medication 10 ML: at 14:00

## 2020-05-22 RX ADMIN — PROPOFOL 200 MG: 10 INJECTION, EMULSION INTRAVENOUS at 10:40

## 2020-05-22 RX ADMIN — FENTANYL CITRATE 50 MCG: 50 INJECTION INTRAMUSCULAR; INTRAVENOUS at 10:40

## 2020-05-22 RX ADMIN — FENTANYL CITRATE 25 MCG: 50 INJECTION INTRAMUSCULAR; INTRAVENOUS at 10:54

## 2020-05-22 RX ADMIN — SODIUM CHLORIDE, SODIUM LACTATE, POTASSIUM CHLORIDE, AND CALCIUM CHLORIDE 75 ML/HR: 600; 310; 30; 20 INJECTION, SOLUTION INTRAVENOUS at 08:15

## 2020-05-22 RX ADMIN — SODIUM CHLORIDE, SODIUM LACTATE, POTASSIUM CHLORIDE, AND CALCIUM CHLORIDE: 600; 310; 30; 20 INJECTION, SOLUTION INTRAVENOUS at 10:35

## 2020-05-22 RX ADMIN — Medication 10 MG: at 11:11

## 2020-05-22 RX ADMIN — CHLORTHALIDONE 50 MG: 25 TABLET ORAL at 19:04

## 2020-05-22 RX ADMIN — Medication 5 ML: at 21:43

## 2020-05-22 RX ADMIN — DEXAMETHASONE SODIUM PHOSPHATE 4 MG: 4 INJECTION, SOLUTION INTRAMUSCULAR; INTRAVENOUS at 10:50

## 2020-05-22 RX ADMIN — Medication 1 AMPULE: at 21:43

## 2020-05-22 RX ADMIN — Medication 1 AMPULE: at 07:17

## 2020-05-22 RX ADMIN — FENTANYL CITRATE 25 MCG: 50 INJECTION INTRAMUSCULAR; INTRAVENOUS at 11:02

## 2020-05-22 RX ADMIN — HYDROCODONE BITARTRATE AND ACETAMINOPHEN 1 TABLET: 7.5; 325 TABLET ORAL at 13:45

## 2020-05-22 RX ADMIN — SODIUM CHLORIDE, SODIUM LACTATE, POTASSIUM CHLORIDE, AND CALCIUM CHLORIDE 75 ML/HR: 600; 310; 30; 20 INJECTION, SOLUTION INTRAVENOUS at 14:04

## 2020-05-22 RX ADMIN — LIDOCAINE HYDROCHLORIDE 80 MG: 20 INJECTION, SOLUTION EPIDURAL; INFILTRATION; INTRACAUDAL; PERINEURAL at 10:40

## 2020-05-22 RX ADMIN — HYDROCODONE BITARTRATE AND ACETAMINOPHEN 1 TABLET: 7.5; 325 TABLET ORAL at 19:04

## 2020-05-22 NOTE — PROGRESS NOTES
TRANSFER - OUT REPORT:    Verbal report given to Elgin Ferrer RN on [place] [facility]  being transferred to Preop for routine progression of care       Report consisted of patients Situation, Background, Assessment and   Recommendations(SBAR). Information from the following report(s) SBAR, Kardex and Recent Results was reviewed with the receiving nurse.     Lines:   Peripheral IV 05/21/20 Right Wrist (Active)   Site Assessment Clean, dry, & intact 5/21/2020  8:16 PM   Phlebitis Assessment 0 5/21/2020  8:16 PM   Infiltration Assessment 0 5/21/2020  8:16 PM   Dressing Status Clean, dry, & intact 5/21/2020  8:16 PM   Dressing Type Tape;Transparent 5/21/2020  8:16 PM   Hub Color/Line Status Pink;Flushed 5/21/2020  8:16 PM        Opportunity for questions and clarification was provided

## 2020-05-22 NOTE — CONSULTS
Hospitalist Consult    Patient: Gaurav Bergman MRN: [id number]  SSN: [id number]    YOB: 1955  Age: 75 y.o. Sex: male      Subjective:      Gaurav Bergman is a 75 y.o. male who lives in his car who is being seen for HTN and medical management. He was admitted by Orthopedics due to left ankle wound needing debridement. He had hardware removed and was discharged on IV antibiotics on 20. He has been non-compliant, not following up at the infusion center nor at appointments with Orthopedics. He then presented to the ER and was found to have a possibly infected wound. He was taken to the OR for debridement on 20. He currently complains of left ankle pain 6/10 and is grimacing in pain. Review of systems negative except stated above. Past Medical History:   Diagnosis Date    Stroke [facility])      Past Surgical History:   Procedure Laterality Date    HX ORTHOPAEDIC      Left ankle fx with pinning      No family history on file.   Social History     Tobacco Use    Smoking status: Current Every Day Smoker     Packs/day: 0.50    Smokeless tobacco: Never Used   Substance Use Topics    Alcohol use: Yes     Comment: occasional      Current Facility-Administered Medications   Medication Dose Route Frequency Provider Last Rate Last Dose    fentaNYL citrate (PF) injection 100 mcg  100 mcg IntraVENous ONCE Ariana Alanis MD        lactated Ringers infusion  75 mL/hr IntraVENous CONTINUOUS Ariana Alanis MD 75 mL/hr at 20 1404 75 mL/hr at 20 1404    midazolam (VERSED) injection 2 mg  2 mg IntraVENous ONCE PRN Ariana Alanis MD        midazolam (VERSED) injection 2 mg  2 mg IntraVENous ONCE Ariana Alanis MD        lidocaine (XYLOCAINE) 10 mg/mL (1 %) injection 0.1 mL  0.1 mL SubCUTAneous PRN Ariana Alanis MD        tuberculin injection 5 Units  5 Units IntraDERMal [facility] [place]   5 Units at 20    sodium chloride (NS) flush 5-40 mL  5-40 mL IntraVENous Q8H ROMA Augustin   10 mL at 05/22/20 1400    sodium chloride (NS) flush 5-40 mL  5-40 mL IntraVENous PRN ROMA Augustin        acetaminophen (TYLENOL) tablet 650 mg  650 mg Oral Q4H PRN ROMA Augustin        HYDROcodone-acetaminophen (NORCO) 7.5-325 mg per tablet 1 Tab  1 Tab Oral Q4H PRN ROMA Augustin   1 Tab at 05/22/20 1345    HYDROmorphone (PF) (DILAUDID) injection 1 mg  1 mg IntraVENous Q4H PRN ROMA Augustin        naloxone [facility]) injection 0.4 mg  0.4 mg IntraVENous PRN ROMA Augustin        diphenhydrAMINE (BENADRYL) injection 12.5 mg  12.5 mg IntraVENous Q4H PRN ROMA Augustin        promethazine (PHENERGAN) with saline injection 12.5 mg  12.5 mg IntraVENous Q6H PRN ROMA Augustin        senna-docusate (PERICOLACE) 8.6-50 mg per tablet 1 Tab  1 Tab Oral DAILY Alexia Augustin        alcohol 62% (NOZIN) nasal  1 Ampule  1 Ampule Topical Q12H Constanza Coelho MD   1 Ampule at 05/22/20 0717        No Known Allergies      Objective:     Vitals:    05/22/20 1240 05/22/20 1255 05/22/20 1310 05/22/20 1325   BP: (!) 168/96 (!) 179/97 (!) 175/109 (!) 147/103   Pulse: 64 (!) 58 63 92   Resp:       Temp:       SpO2: 100% 100% 98% 99%   Weight:       Height:            Physical Exam:   GENERAL: alert, cooperative, appears in pain  EYE: conjunctivae/corneas clear. PERRL. THROAT & NECK: normal and no erythema or exudates noted. LUNG: clear to auscultation bilaterally  HEART: regular rate and rhythm, S1S2, no murmur, no JVD  ABDOMEN: soft, non-tender, non-distended. Bowel sounds normal.   EXTREMITIES:  Right leg in ACE wrap  SKIN: no rash or abnormalities  NEUROLOGIC: A&Ox3. Cranial nerves 2-12 grossly intact.     Lab/Data Review:  Recent Results (from the past 24 hour(s))   METABOLIC PANEL, BASIC    Collection Time: 05/21/20  8:43 PM   Result Value Ref Range    Sodium 144 136 - 145 mmol/L    Potassium 3.4 (L) 3.5 - 5.1 mmol/L Chloride 108 (H) 98 - 107 mmol/L    CO2 30 21 - 32 mmol/L    Anion gap 6 (L) 7 - 16 mmol/L    Glucose 92 65 - 100 mg/dL    BUN 14 8 - 23 MG/DL    Creatinine 1.02 0.8 - 1.5 MG/DL    GFR est AA >60 >60 ml/min/1.73m2    GFR est non-AA >60 >60 ml/min/1.73m2    Calcium 9.3 8.3 - 10.4 MG/DL   CBC W/O DIFF    Collection Time: 05/21/20  8:43 PM   Result Value Ref Range    WBC 5.5 4.3 - 11.1 K/uL    RBC 3.78 (L) 4.23 - 5.6 M/uL    HGB 10.2 (L) 13.6 - 17.2 g/dL    HCT 31.8 (L) 41.1 - 50.3 %    MCV 84.1 79.6 - 97.8 FL    MCH 27.0 26.1 - 32.9 PG    MCHC 32.1 31.4 - 35.0 g/dL    RDW 15.3 (H) 11.9 - 14.6 %    PLATELET 412 245 - 552 K/uL    MPV 10.9 9.4 - 12.3 FL    ABSOLUTE NRBC 0.00 0.0 - 0.2 K/uL   URINALYSIS W/ RFLX MICROSCOPIC    Collection Time: 05/21/20 11:47 PM   Result Value Ref Range    Color WILLAM      Appearance CLOUDY      Specific gravity 1.028 (H) 1.001 - 1.023      pH (UA) 5.5 5.0 - 9.0      Protein TRACE (A) NEG mg/dL    Glucose Negative mg/dL    Ketone Negative NEG mg/dL    Bilirubin Negative NEG      Blood Negative NEG      Urobilinogen 0.2 0.2 - 1.0 EU/dL    Nitrites Negative NEG      Leukocyte Esterase Negative NEG      WBC 10-20 0 /hpf    RBC 0-3 0 /hpf    Epithelial cells 0-3 0 /hpf    Bacteria 0 0 /hpf    Casts 5-10 0 /lpf       Imaging:  Xr Tib/fib Lt    Result Date: 5/21/2020  Impression: Findings likely represent new acute comminuted nondisplaced fracture of the distal fibular metadiaphysis, superimposed on a chronic fracture deformity. Cultures:   All Micro Results     None          Assessment/Plan:     Principal Problem:    Postoperative wound infection (5/21/2020)  - S/P excisional debridement of left ankle wounds with debridement of skin subcutaneous tissue muscle and bone area  - No antibiotics  - Currently in lots of pain    Active Problems:    Deep postoperative wound infection (5/21/2020)  - S/P excisional debridement of left ankle wounds with debridement of skin subcutaneous tissue muscle and bone area  - No antibiotics  - Currently in lots of pain      High blood pressure (5/22/2020)  - I suspect this is undiagnosed HTN and extreme pain after surgery  - Had high BP on previous admission  - Start Chlorthalidone 50mg daily  - Will add PRN Hydralazine      Non-compliant patient (5/22/2020)  - Patient already asking to go \"home\" tomorrow  - Missed multiple appts  - Did not follow up at infusion center for antibiotics    Thank you for allowing us to participate in the care of this patient. We will follow along with you.       Signed By: Yarelis Hess DO     May 22, 2020

## 2020-05-22 NOTE — PROGRESS NOTES
TRANSFER - IN REPORT:    Verbal report received from [facility] on Winston Matsu  being received from PACU for routine progression of care      Report consisted of patients Situation, Background, Assessment and   Recommendations(SBAR). Information from the following report(s) SBAR was reviewed with the receiving nurse. Opportunity for questions and clarification was provided. Assessment completed upon patients arrival to unit and care assumed.

## 2020-05-22 NOTE — PROGRESS NOTES
Problem: Falls - Risk of  Goal: *Absence of Falls  Description: Document [place] Led Fall Risk and appropriate interventions in the flowsheet. Outcome: Progressing Towards Goal  Note: Fall Risk Interventions:  Mobility Interventions: Bed/chair exit alarm, OT consult for ADLs, Patient to call before getting OOB, PT Consult for mobility concerns         Medication Interventions: Patient to call before getting OOB, Teach patient to arise slowly                   Problem: Patient Education: Go to Patient Education Activity  Goal: Patient/Family Education  Outcome: Progressing Towards Goal     Problem: Falls - Risk of  Goal: *Absence of Falls  Description: Document [place] Led Fall Risk and appropriate interventions in the flowsheet.   Outcome: Progressing Towards Goal  Note: Fall Risk Interventions:  Mobility Interventions: Bed/chair exit alarm, OT consult for ADLs, Patient to call before getting OOB, PT Consult for mobility concerns         Medication Interventions: Patient to call before getting OOB, Teach patient to arise slowly                   Problem: Pain  Goal: *Control of Pain  Outcome: Progressing Towards Goal

## 2020-05-22 NOTE — H&P
Consult    Patient: Yuri Womack MRN: [id number]  SSN: [id number]    YOB: 1955  Age: 75 y.o. Sex: male      Subjective:      Yuri Womack is a 75 y.o. male who had open reduction internal fixation of his left ankle fracture at an outside institution several months ago. Instead of going back to that institution he came to our several weeks ago with an open wound in his left ankle. I treated this with debridement of his wound and hardware removal.  He has missed a couple follow-up appointments and then followed up yesterday with drainage from his left ankle so he was admitted for repeat debridement.     Past Medical History:   Diagnosis Date    Stroke [facility])      Past Surgical History:   Procedure Laterality Date    HX ORTHOPAEDIC      Left ankle fx with pinning      FAMHX -No history of inflammatory arthritis   Social History     Tobacco Use    Smoking status: Current Every Day Smoker     Packs/day: 0.50    Smokeless tobacco: Never Used   Substance Use Topics    Alcohol use: Yes     Comment: occasional      Current Facility-Administered Medications   Medication Dose Route Frequency Provider Last Rate Last Dose    tuberculin injection 5 Units  5 Units IntraDERMal ROMA Atkinson   5 Units at 05/21/20 1946    sodium chloride (NS) flush 5-40 mL  5-40 mL IntraVENous Q8H ROMA Billings   5 mL at 05/22/20 0542    sodium chloride (NS) flush 5-40 mL  5-40 mL IntraVENous PRN ROMA Billings        acetaminophen (TYLENOL) tablet 650 mg  650 mg Oral Q4H PRN ROMA Billings        HYDROcodone-acetaminophen (NORCO) 7.5-325 mg per tablet 1 Tab  1 Tab Oral Q4H PRN ROMA Billings        HYDROmorphone (PF) (DILAUDID) injection 1 mg  1 mg IntraVENous Q4H PRN ROMA Billings        naloxone [facility]) injection 0.4 mg  0.4 mg IntraVENous PRN ROMA Billings        diphenhydrAMINE (BENADRYL) injection 12.5 mg  12.5 mg IntraVENous Q4H PRN ROMA Billings  promethazine (PHENERGAN) with saline injection 12.5 mg  12.5 mg IntraVENous Q6H PRN ROMA Archuleta        senna-docusate (PERICOLACE) 8.6-50 mg per tablet 1 Tab  1 Tab Oral DAILY Alexia Archuleta        alcohol 62% (NOZIN) nasal  1 Ampule  1 Ampule Topical Q12H Aj Berman MD   1 Ampule at 05/22/20 0876        No Known Allergies    Review of Systems:  A comprehensive review of systems was negative except for that written in the History of Present Illness. Objective:     Vitals:    05/21/20 2024 05/21/20 2340 05/22/20 0301 05/22/20 0536   BP: 136/85 139/71 (!) 158/95 153/85   Pulse: 70 79 73    Resp: 16 16 16    Temp: 98.1 °F (36.7 °C) 98.5 °F (36.9 °C) 99.1 °F (37.3 °C) 97.9 °F (36.6 °C)   SpO2: 97% 98% 98%         Physical Exam:  Physical Exam:  General:  Alert, cooperative, no distress, appears stated age. Orientation he is alert and oriented release to person   Eyes:  Conjunctivae/corneas clear. PERRL, EOMs intact. Fundi benign   Ears:  Normal TMs and external ear canals both ears. Nose: Nares normal. Septum midline. Mucosa normal. No drainage or sinus tenderness. Mouth/Throat: Lips, mucosa, and tongue normal. Teeth and gums normal.   Neck: Supple, symmetrical, trachea midline, no adenopathy, thyroid: no enlargment/tenderness/nodules, no carotid bruit and no JVD. Back:   Symmetric, no curvature. ROM normal. No CVA tenderness. Lungs:   Clear to auscultation bilaterally. Heart:  Regular rate and rhythm, S1, S2 normal, no murmur, click, rub or gallop. Abdomen:   Soft, non-tender. Bowel sounds normal. No masses,  No organomegaly. No lymphadenopathy in all 4 extremities  Alignment- obvious deformity left ankle  Range of motion- with any range of motion left ankle  Vascular-distal pulses palpable in lower extremity  Sensory/motor-deep tendon reflexes normal left lower extremity. Motor and sensory function intact.   Stability- no obvious instability left ankle  Tenderness to palpation at the left ankle area  Skin- his wound shows significant drainage from the lateral wound  Gait-he has a market antalgic gait left lower extremity    Assessment:     Hospital Problems  Never Reviewed          Codes Class Noted POA    * (Principal) Postoperative wound infection ICD-10-CM: T81.49XA  ICD-9-CM: 998.59  5/21/2020 Yes        Deep postoperative wound infection ICD-10-CM: O03.59GS  ICD-9-CM: 998.59  5/21/2020 Unknown            Wound breakdown left ankle    Xrays and or studies:     The lateral left tib-fib shows that the overall alignment of his ankle mortise is reasonable  Plan:     Proceed back to the operating room for debridement left ankle wound today then likely a wound VAC over his lateral ankle wound    Signed By: Staci Regalado MD     May 22, 2020

## 2020-05-22 NOTE — OP NOTES
Operative Report    Patient: Ricky Davey MRN: [id number]  SSN: [id number]    YOB: 1955  Age: 75 y.o. Sex: male       Date of Surgery: 5/22/2020     History:  Ricky Davey is a 75 y.o. male who had open reduction internal fixation of the left lateral malleolus fracture at outside institution. He presented to our emergency room several weeks ago with drainage and open wound. I removed his hardware and debrided this. He returns today for repeat debridement as he is having significant drainage from his left ankle. I talked to the patient and/or their representative and explained the exact nature the procedure. I also went through a detailed list of the material risks associated with  the procedure which included risk of bleeding, infection, injury to nearby structures, worsening the situation, as well as the risks associate with anesthesia and finally death. Also talked with him regarding the benefits and alternatives to the procedure. Preoperative Diagnosis: Superficial foreign body of left ankle with infection, initial encounter [S90.552A, L08.9]     Postoperative Diagnosis: left ankle with infection, initial encounter [N75.566B, L08.9] left ankle wound    Surgeon(s) and Role:     * Marcus Yanez MD - Primary    Anesthesia: General     Procedure: Procedure(s):  Excisional debridement of left ankle wounds with debridement of skin subcutaneous tissue muscle and bone area less than 20 cm²    Procedure in Detail: After the successful duction of general anesthetic the left lower extremity was examined I did remove his sutures. We then prepped and draped the left lower extremity. I then debrided 2 areas 1 approximately a centimeter in diameter 1 approximately a centimeter and a half in diameter on the anterior aspect of the left leg. Through the lateralmost wound I was able to debride the fibula itself.   I used a curette as well as a rondure to excise small amounts of subcutaneous tissue and then used a curette itself to debride the bone and the depth of the wound which was about 2 to 3 cm deep. After thorough debridement with a rondure as well as the curette I turned my attention to the medial wound and basically did the same in this wound. I then irrigated with a copious amount normal saline and placed quarter inch iodoform packing in both the anterior wounds and then dressed the leg and the patient was awakened and taken to cover him in stable condition      Estimated Blood Loss: 30 cc    Tourniquet Time:   Total Tourniquet Time Documented:  Thigh (Left) - 8 minutes  Total: Thigh (Left) - 8 minutes        Implants: * No implants in log *            Specimens: * No specimens in log *        Drains: None                Complications: None    Counts: Sponge and needle counts were correct times two.     Signed By:  Federico Petty MD     May 22, 2020

## 2020-05-22 NOTE — ANESTHESIA POSTPROCEDURE EVALUATION
Procedure(s):  LEFT ANKLE INCISION AND DEBRIDEMENT/ CHOICE/ ROOM  735 PT BEING AMITTED 5/21. general    Anesthesia Post Evaluation      Multimodal analgesia: multimodal analgesia used between 6 hours prior to anesthesia start to PACU discharge  Patient location during evaluation: PACU  Patient participation: complete - patient participated  Level of consciousness: responsive to verbal stimuli and awake  Pain management: adequate  Airway patency: patent  Anesthetic complications: no  Cardiovascular status: acceptable  Respiratory status: acceptable, spontaneous ventilation and nonlabored ventilation  Hydration status: acceptable  Post anesthesia nausea and vomiting:  none      Vitals Value Taken Time   /77 5/22/2020 11:55 AM   Temp 36.8 °C (98.2 °F) 5/22/2020 11:52 AM   Pulse 77 5/22/2020 11:56 AM   Resp 16 5/22/2020 11:52 AM   SpO2 96 % 5/22/2020 11:56 AM   Vitals shown include unvalidated device data.

## 2020-05-22 NOTE — PROGRESS NOTES
CM met with patient at bedside maintaining social distancing and wearing a mask. Patient is alert and oriented. Demographics, PCP and insurance reviewed and verified. Patient does say that he has an emergency contact he would like to add who is his son, Skyla Madrigal. The phone number is programmmed in his cell phone which is in his car, therefore he is not able to provide at this time. Patient lives out of his car without difficulty, has insurance  and states that he missed his wound appointment with Dr. J Carlos Mohan related to a \"mixup. \"  Patient was extremely unhappy about this and is questioning why he is not improving. Reassurance provided. Goal for patient is to discharge as before back to his car with follow up appointments. CM will be available for continued discharge needs. Care Management Interventions  PCP Verified by CM: Yes(Luis)  Transition of Care Consult (CM Consult): Discharge Planning  Discharge Durable Medical Equipment: No  Physical Therapy Consult: No  Occupational Therapy Consult: No  Speech Therapy Consult: No  Current Support Network:  Other(Lives out of his car)  Confirm Follow Up Transport: Self  Discharge Location  Discharge Placement: Unable to determine at this time

## 2020-05-22 NOTE — PERIOP NOTES
TRANSFER - OUT REPORT:    Verbal report given to Faith  on Arian Everett  being transferred to [facility] [phone] for routine post - op       Report consisted of patients Situation, Background, Assessment and   Recommendations(SBAR). Information from the following report(s) OR Summary, Procedure Summary, Intake/Output and MAR was reviewed with the receiving nurse. Lines:   Peripheral IV 05/21/20 Right Wrist (Active)   Site Assessment Clean, dry, & intact 5/22/2020  7:45 AM   Phlebitis Assessment 0 5/22/2020  7:45 AM   Infiltration Assessment 0 5/22/2020  7:45 AM   Dressing Status Clean, dry, & intact 5/22/2020  7:45 AM   Dressing Type Transparent 5/22/2020  7:45 AM   Hub Color/Line Status Pink;Flushed 5/21/2020  8:16 PM        Opportunity for questions and clarification was provided. Patient transported with:   0    VTE prophylaxis orders have been written for Arian Everett. Patient and family given floor number and nurses name. Family updated re: pt status after security code verified.

## 2020-05-23 LAB
MM INDURATION POC: 0 MM (ref 0–5)
PPD POC: NEGATIVE NEGATIVE

## 2020-05-23 PROCEDURE — 74011250636 HC RX REV CODE- 250/636: Performed by: ANESTHESIOLOGY

## 2020-05-23 PROCEDURE — 74011250637 HC RX REV CODE- 250/637: Performed by: INTERNAL MEDICINE

## 2020-05-23 PROCEDURE — 74011250636 HC RX REV CODE- 250/636: Performed by: PHYSICIAN ASSISTANT

## 2020-05-23 PROCEDURE — 74011250637 HC RX REV CODE- 250/637: Performed by: PHYSICIAN ASSISTANT

## 2020-05-23 PROCEDURE — 65270000029 HC RM PRIVATE

## 2020-05-23 PROCEDURE — 74011250637 HC RX REV CODE- 250/637: Performed by: ORTHOPAEDIC SURGERY

## 2020-05-23 RX ADMIN — HYDROCODONE BITARTRATE AND ACETAMINOPHEN 1 TABLET: 7.5; 325 TABLET ORAL at 06:18

## 2020-05-23 RX ADMIN — CHLORTHALIDONE 50 MG: 25 TABLET ORAL at 09:34

## 2020-05-23 RX ADMIN — HYDROCODONE BITARTRATE AND ACETAMINOPHEN 1 TABLET: 7.5; 325 TABLET ORAL at 21:28

## 2020-05-23 RX ADMIN — Medication 1 AMPULE: at 21:27

## 2020-05-23 RX ADMIN — Medication 10 ML: at 15:30

## 2020-05-23 RX ADMIN — HYDROMORPHONE HYDROCHLORIDE 1 MG: 1 INJECTION, SOLUTION INTRAMUSCULAR; INTRAVENOUS; SUBCUTANEOUS at 16:12

## 2020-05-23 RX ADMIN — HYDROMORPHONE HYDROCHLORIDE 1 MG: 1 INJECTION, SOLUTION INTRAMUSCULAR; INTRAVENOUS; SUBCUTANEOUS at 23:52

## 2020-05-23 RX ADMIN — SODIUM CHLORIDE, SODIUM LACTATE, POTASSIUM CHLORIDE, AND CALCIUM CHLORIDE 75 ML/HR: 600; 310; 30; 20 INJECTION, SOLUTION INTRAVENOUS at 06:18

## 2020-05-23 RX ADMIN — SENNOSIDES AND DOCUSATE SODIUM 1 TABLET: 8.6; 5 TABLET ORAL at 09:34

## 2020-05-23 RX ADMIN — Medication 1 AMPULE: at 09:34

## 2020-05-23 RX ADMIN — Medication 5 ML: at 21:28

## 2020-05-23 NOTE — PROGRESS NOTES
Orthopedic Progress Note    May 23, 2020  Admit Date: 2020  Admit Diagnosis: Deep postoperative wound infection [T81.42XA]    Post Op day: 1 Day Post-Op    Subjective:     Jaky Hebert     Appears to be doing okay.  Seems frustrated       Objective:     Vital Signs:    Temp (24hrs), Av °F (36.7 °C), Min:97.8 °F (36.6 °C), Max:98.3 °F (36.8 °C)      LAB:    [unfilled]  No results found for: INR, INREXT  Lab Results   Component Value Date/Time    HGB 10.2 (L) 2020 08:43 PM    HGB 10.3 (L) 2020 05:14 AM       Physical Exam:    No apparent distress   No neurovascular issues reported  No gross problems noted   L ankle dressing clean & dry    Plan:     Continue PT/OT rehab as indicated    Nursing and SS for disposition plans    Plan wound check tomorrow       Signed By: Armando Guillen MD

## 2020-05-23 NOTE — PROGRESS NOTES
Hospitalist Progress Note    Patient: Nathan Demarco MRN: [id number]  SSN: [id number]    YOB: 1955  Age: 75 y.o. Sex: male      Admit Date: 5/21/2020    LOS: 2 days     Subjective:     75 y.o. male who lives in his car who is being seen for HTN and medical management.   He was admitted by Orthopedics due to left ankle wound needing debridement. He had hardware removed and was discharged on IV antibiotics on 5/11/20. He has been non-compliant, not following up at the infusion center nor at appointments with Orthopedics. He then presented to the ER and was found to have a possibly infected wound. He was taken to the OR for debridement on 5/22/20.    5/23 - He complains of 6/10 left ankle pain but refusing to take pain medications due to not wanting to become addicted. Denies CP/SOB. Review of systems negative except stated above. Objective:     Visit Vitals  /77 (BP 1 Location: Left arm, BP Patient Position: At rest)   Pulse 75   Temp 98.6 °F (37 °C)   Resp 18   Ht 6' 1\" (1.854 m)   Wt 81.6 kg (180 lb)   SpO2 100%   BMI 23.75 kg/m²      Oxygen Therapy  O2 Sat (%): 100 % (05/23/20 0744)  Pulse via Oximetry: 71 beats per minute (05/22/20 1157)  O2 Device: Oxygen mask (05/22/20 1128)      Intake and Output:     Intake/Output Summary (Last 24 hours) at 5/23/2020 0804  Last data filed at 5/23/2020 0744  Gross per 24 hour   Intake 400 ml   Output 1710 ml   Net -1310 ml         Physical Exam:   GENERAL: alert, cooperative, no distress, appears stated age  EYE: conjunctivae/corneas clear. PERRL. THROAT & NECK: normal and no erythema or exudates noted. LUNG: clear to auscultation bilaterally  HEART: regular rate and rhythm, S1S2, no murmur, no JVD  ABDOMEN: soft, non-tender, non-distended. Bowel sounds normal.   EXTREMITIES:  Left ankle in ACE wrap, 2+ pedal/radial pulses bilaterally  SKIN: no rash or abnormalities  NEUROLOGIC: A&Ox3. Cranial nerves 2-12 grossly intact.     Rolanda Merle Review:  No results found for this or any previous visit (from the past 24 hour(s)). Imaging:  Xr Tib/fib Lt    Result Date: 5/21/2020  Exam:  Left tibia fibula radiographs History:  pain, s/p hardware removal left ankle, 75 years Male Comparison: Left tibia fibula radiographs April 26, 2020 , left ankle radiographs April 29, 2020 Findings: Again seen are ghost tracks through the distal tibia and fibula. Comminuted fracture fragments are seen of the distal fibular metadiaphysis which appear new since the prior ankle films, likely representing new acute nondisplaced fractures. Persistent mild diffuse osteopenia. Visualized soft tissues otherwise unremarkable. Impression: Findings likely represent new acute comminuted nondisplaced fracture of the distal fibular metadiaphysis, superimposed on a chronic fracture deformity. Xr Tib/fib Lt    Result Date: 4/26/2020  LEFT TIB-FIB 2 view(s). HISTORY: Cellulitis and wound. TECHNIQUE: AP and lateral views. COMPARISON: None. FINDINGS: There is a lateral plate and screws transfixing the distal fibula and distal tibia. No periostitis or erosions. There is soft tissue thickening. No soft tissue gas. IMPRESSION: Postop changes and soft tissue thickening. No periostitis. LEFT ANKLE 3 VIEW(S). HISTORY: Pain and swelling. TECHNIQUE: AP and lateral and oblique views. COMPARISON: None. FINDINGS: Old healed distal fibular fracture. There is lateral plate with distal screws transfixing the fibula and tibia. There is overlying soft tissue swelling with tiny densities, possibly metallic. No periostitis. IMPRESSION: Soft tissue swelling. Postop changes. No periostitis. Xr Ankle Lt Min 3 V    Result Date: 4/29/2020  Left ankle radiographs 4/29/2020 CLINICAL HISTORY: Heart removal and incision/drainage. COMPARISON: Left ankle radiographs 4/26/2020 FINDINGS: 3 views of the left ankle are submitted for evaluation.  Plain film assessment is limited by portable technique, patient motion, and splint artifact. There appears to be interval removal of prior metallic hardware stabilizing the tibiofibular syndesmosis and distal fibula. Tiny faint residual metallic densities are seen which were present on the prior examination. Additional wire-like densities in gauge the mid tarsal bones which were present on the prior examination. No acute appearing fracture line, or evidence for dislocation is seen. Mild soft tissue gas is seen which is not clearly abnormal given the recent surgery. Persistent soft tissue edema is which may be improved although this may be due to placement of a splint. IMPRESSION: 1. Hardware removal as described above. Xr Ankle Lt Min 3 V    Result Date: 4/26/2020  LEFT TIB-FIB 2 view(s). HISTORY: Cellulitis and wound. TECHNIQUE: AP and lateral views. COMPARISON: None. FINDINGS: There is a lateral plate and screws transfixing the distal fibula and distal tibia. No periostitis or erosions. There is soft tissue thickening. No soft tissue gas. IMPRESSION: Postop changes and soft tissue thickening. No periostitis. LEFT ANKLE 3 VIEW(S). HISTORY: Pain and swelling. TECHNIQUE: AP and lateral and oblique views. COMPARISON: None. FINDINGS: Old healed distal fibular fracture. There is lateral plate with distal screws transfixing the fibula and tibia. There is overlying soft tissue swelling with tiny densities, possibly metallic. No periostitis. IMPRESSION: Soft tissue swelling. Postop changes. No periostitis. No results found for this visit on 05/21/20. Cultures:   All Micro Results     None          Assessment/Plan:     Principal Problem:    Postoperative wound infection (5/21/2020)  - S/P excisional debridement of left ankle wounds with debridement of skin subcutaneous tissue muscle and bone area  - No antibiotics  - Currently in lots of pain     Active Problems:    Deep postoperative wound infection (5/21/2020)  - S/P excisional debridement of left ankle wounds with debridement of skin subcutaneous tissue muscle and bone area  - No antibiotics  - Currently in lots of pain       High blood pressure (5/22/2020)  - Continues to be elevated  - I suspect this is undiagnosed HTN and extreme pain after surgery  - Had high BP on previous admission  - Continue Chlorthalidone 50mg daily  - Continue PRN Hydralazine       Non-compliant patient (5/22/2020)  - Patient already asking to go \"home\" to his car  - Missed multiple appts  - Did not follow up at infusion center for antibiotics     Thank you for allowing us to participate in the care of this patient. We will follow along with you.       Signed By: Cash Willams,      May 23, 2020

## 2020-05-24 PROCEDURE — 74011250637 HC RX REV CODE- 250/637: Performed by: INTERNAL MEDICINE

## 2020-05-24 PROCEDURE — 65270000029 HC RM PRIVATE

## 2020-05-24 PROCEDURE — 74011250637 HC RX REV CODE- 250/637: Performed by: ORTHOPAEDIC SURGERY

## 2020-05-24 RX ORDER — CHLORTHALIDONE 25 MG/1
100 TABLET ORAL DAILY
Status: DISCONTINUED | OUTPATIENT
Start: 2020-05-24 | End: 2020-05-26 | Stop reason: HOSPADM

## 2020-05-24 RX ADMIN — CHLORTHALIDONE 100 MG: 25 TABLET ORAL at 09:56

## 2020-05-24 RX ADMIN — Medication 5 ML: at 06:00

## 2020-05-24 RX ADMIN — Medication 10 ML: at 15:44

## 2020-05-24 RX ADMIN — Medication 1 AMPULE: at 09:56

## 2020-05-24 RX ADMIN — Medication 1 AMPULE: at 22:26

## 2020-05-24 RX ADMIN — Medication 10 ML: at 22:26

## 2020-05-24 NOTE — PROGRESS NOTES
Orthopedic Progress Note    May 24, 2020  Admit Date: 2020  Admit Diagnosis: Deep postoperative wound infection [T81.42XA]    Post Op day: 2 Days Post-Op    Subjective:     Sukhdeep Dueñas     Appears to be doing okay       Objective:     Vital Signs:    Temp (24hrs), Av.9 °F (36.6 °C), Min:97.5 °F (36.4 °C), Max:98.3 °F (36.8 °C)      LAB:    [unfilled]  No results found for: INR, INREXT  Lab Results   Component Value Date/Time    HGB 10.2 (L) 2020 08:43 PM    HGB 10.3 (L) 2020 05:14 AM       Physical Exam:    LLE wounds benign in appearance, calf soft, resting in some equinus. Not splinted    Plan:     Continue PT/OT rehab as indicated    Nursing and SS for disposition plans    Consult PT.  Advised ROM L ankle to prevent equinus       Signed By: Reshma Marte MD

## 2020-05-24 NOTE — PROGRESS NOTES
Problem: Patient Education: Go to Patient Education Activity  Goal: Patient/Family Education  5/23/2020 2032 by Nicole Sue RN  Outcome: Progressing Towards Goal  5/23/2020 0645 by Nicole Sue RN  Outcome: Progressing Towards Goal     Problem: Pain  Goal: *Control of Pain  5/23/2020 2032 by Nicole Sue RN  Outcome: Progressing Towards Goal  5/23/2020 0652 by Nicole Sue RN  Outcome: Progressing Towards Goal

## 2020-05-24 NOTE — PROGRESS NOTES
Hospitalist Progress Note    Patient: Brian Subramanian MRN: [id number]  SSN: [id number]    YOB: 1955  Age: 75 y.o. Sex: male      Admit Date: 5/21/2020    LOS: 3 days     Subjective:     75 y.o. male who lives in his car who is being seen for HTN and medical management.   He was admitted by Orthopedics due to left ankle wound needing debridement. He had hardware removed and was discharged on IV antibiotics on 5/11/20. He has been non-compliant, not following up at the infusion center nor at appointments with Orthopedics. He then presented to the ER and was found to have a possibly infected wound. He was taken to the OR for debridement on 5/22/20.    5/24 - He complains of 4/10 left ankle pain. Now taking pain medications. Denies CP/SOB. Review of systems negative except stated above. Objective:     Visit Vitals  /85 (BP 1 Location: Left arm, BP Patient Position: At rest)   Pulse 81   Temp 97.6 °F (36.4 °C)   Resp 18   Ht 6' 1\" (1.854 m)   Wt 81.6 kg (180 lb)   SpO2 100%   BMI 23.75 kg/m²      Oxygen Therapy  O2 Sat (%): 100 % (05/24/20 0752)  Pulse via Oximetry: 71 beats per minute (05/22/20 1157)  O2 Device: Oxygen mask (05/22/20 1128)      Intake and Output:     Intake/Output Summary (Last 24 hours) at 5/24/2020 0754  Last data filed at 5/24/2020 0510  Gross per 24 hour   Intake --   Output 1100 ml   Net -1100 ml         Physical Exam:   GENERAL: alert, cooperative, no distress, appears stated age  EYE: conjunctivae/corneas clear. PERRL. THROAT & NECK: normal and no erythema or exudates noted. LUNG: clear to auscultation bilaterally  HEART: regular rate and rhythm, S1S2, no murmur, no JVD  ABDOMEN: soft, non-tender, non-distended. Bowel sounds normal.   EXTREMITIES:  Left ankle in ACE wrap, 2+ pedal/radial pulses bilaterally  SKIN: no rash or abnormalities  NEUROLOGIC: A&Ox3. Cranial nerves 2-12 grossly intact.     Lab/Data Review:  Recent Results (from the past 24 hour(s)) PLEASE READ & DOCUMENT PPD TEST IN 48 HRS    Collection Time: 05/23/20  9:30 PM   Result Value Ref Range    PPD Negative Negative    mm Induration 0 0 - 5 mm       Imaging:  Xr Tib/fib Lt    Result Date: 5/21/2020  Exam:  Left tibia fibula radiographs History:  pain, s/p hardware removal left ankle, 75 years Male Comparison: Left tibia fibula radiographs April 26, 2020 , left ankle radiographs April 29, 2020 Findings: Again seen are ghost tracks through the distal tibia and fibula. Comminuted fracture fragments are seen of the distal fibular metadiaphysis which appear new since the prior ankle films, likely representing new acute nondisplaced fractures. Persistent mild diffuse osteopenia. Visualized soft tissues otherwise unremarkable. Impression: Findings likely represent new acute comminuted nondisplaced fracture of the distal fibular metadiaphysis, superimposed on a chronic fracture deformity. Xr Tib/fib Lt    Result Date: 4/26/2020  LEFT TIB-FIB 2 view(s). HISTORY: Cellulitis and wound. TECHNIQUE: AP and lateral views. COMPARISON: None. FINDINGS: There is a lateral plate and screws transfixing the distal fibula and distal tibia. No periostitis or erosions. There is soft tissue thickening. No soft tissue gas. IMPRESSION: Postop changes and soft tissue thickening. No periostitis. LEFT ANKLE 3 VIEW(S). HISTORY: Pain and swelling. TECHNIQUE: AP and lateral and oblique views. COMPARISON: None. FINDINGS: Old healed distal fibular fracture. There is lateral plate with distal screws transfixing the fibula and tibia. There is overlying soft tissue swelling with tiny densities, possibly metallic. No periostitis. IMPRESSION: Soft tissue swelling. Postop changes. No periostitis. Xr Ankle Lt Min 3 V    Result Date: 4/29/2020  Left ankle radiographs 4/29/2020 CLINICAL HISTORY: Heart removal and incision/drainage.  COMPARISON: Left ankle radiographs 4/26/2020 FINDINGS: 3 views of the left ankle are submitted for evaluation. Plain film assessment is limited by portable technique, patient motion, and splint artifact. There appears to be interval removal of prior metallic hardware stabilizing the tibiofibular syndesmosis and distal fibula. Tiny faint residual metallic densities are seen which were present on the prior examination. Additional wire-like densities in gauge the mid tarsal bones which were present on the prior examination. No acute appearing fracture line, or evidence for dislocation is seen. Mild soft tissue gas is seen which is not clearly abnormal given the recent surgery. Persistent soft tissue edema is which may be improved although this may be due to placement of a splint. IMPRESSION: 1. Hardware removal as described above. Xr Ankle Lt Min 3 V    Result Date: 4/26/2020  LEFT TIB-FIB 2 view(s). HISTORY: Cellulitis and wound. TECHNIQUE: AP and lateral views. COMPARISON: None. FINDINGS: There is a lateral plate and screws transfixing the distal fibula and distal tibia. No periostitis or erosions. There is soft tissue thickening. No soft tissue gas. IMPRESSION: Postop changes and soft tissue thickening. No periostitis. LEFT ANKLE 3 VIEW(S). HISTORY: Pain and swelling. TECHNIQUE: AP and lateral and oblique views. COMPARISON: None. FINDINGS: Old healed distal fibular fracture. There is lateral plate with distal screws transfixing the fibula and tibia. There is overlying soft tissue swelling with tiny densities, possibly metallic. No periostitis. IMPRESSION: Soft tissue swelling. Postop changes. No periostitis. No results found for this visit on 05/21/20. Cultures:   All Micro Results     None          Assessment/Plan:     Principal Problem:    Postoperative wound infection (5/21/2020)  - S/P excisional debridement of left ankle wounds with debridement of skin subcutaneous tissue muscle and bone area  - No antibiotics  - Currently in lots of pain     Active Problems:    Deep postoperative wound infection (5/21/2020)  - S/P excisional debridement of left ankle wounds with debridement of skin subcutaneous tissue muscle and bone area  - No antibiotics  - Currently in lots of pain       High blood pressure (5/22/2020)  - Continues to be elevated  - I suspect this is undiagnosed HTN and extreme pain after surgery  - Had high BP on previous admission  - Increase Chlorthalidone 50mg to 100mg daily  - Continue PRN Hydralazine       Non-compliant patient (5/22/2020)  - Patient already asking to go \"home\" to his car  - Missed multiple appts  - Did not follow up at infusion center for antibiotics     Thank you for allowing us to participate in the care of this patient. We will follow along with you.       Signed By: Asad Simms DO     May 24, 2020

## 2020-05-25 PROCEDURE — 97161 PT EVAL LOW COMPLEX 20 MIN: CPT

## 2020-05-25 PROCEDURE — 77030027138 HC INCENT SPIROMETER -A

## 2020-05-25 PROCEDURE — 77030041247 HC PROTECTOR HEEL HEELMEDIX MDII -B

## 2020-05-25 PROCEDURE — 65270000029 HC RM PRIVATE

## 2020-05-25 PROCEDURE — 74011250637 HC RX REV CODE- 250/637: Performed by: ORTHOPAEDIC SURGERY

## 2020-05-25 PROCEDURE — 74011250637 HC RX REV CODE- 250/637: Performed by: PHYSICIAN ASSISTANT

## 2020-05-25 PROCEDURE — 74011250636 HC RX REV CODE- 250/636: Performed by: ORTHOPAEDIC SURGERY

## 2020-05-25 PROCEDURE — 74011250637 HC RX REV CODE- 250/637: Performed by: INTERNAL MEDICINE

## 2020-05-25 PROCEDURE — 94760 N-INVAS EAR/PLS OXIMETRY 1: CPT

## 2020-05-25 PROCEDURE — 77030040361 HC SLV COMPR DVT MDII -B

## 2020-05-25 RX ORDER — VANCOMYCIN 2 GRAM/500 ML IN 0.9 % SODIUM CHLORIDE INTRAVENOUS
2000 ONCE
Status: COMPLETED | OUTPATIENT
Start: 2020-05-25 | End: 2020-05-25

## 2020-05-25 RX ORDER — VANCOMYCIN HYDROCHLORIDE
1250 EVERY 12 HOURS
Status: DISCONTINUED | OUTPATIENT
Start: 2020-05-25 | End: 2020-05-26 | Stop reason: HOSPADM

## 2020-05-25 RX ADMIN — Medication 10 ML: at 05:48

## 2020-05-25 RX ADMIN — VANCOMYCIN HYDROCHLORIDE 1250 MG: 10 INJECTION, POWDER, LYOPHILIZED, FOR SOLUTION INTRAVENOUS at 21:57

## 2020-05-25 RX ADMIN — Medication 10 ML: at 12:30

## 2020-05-25 RX ADMIN — Medication 1 AMPULE: at 08:50

## 2020-05-25 RX ADMIN — Medication 1 AMPULE: at 21:56

## 2020-05-25 RX ADMIN — Medication 10 ML: at 21:56

## 2020-05-25 RX ADMIN — SENNOSIDES AND DOCUSATE SODIUM 1 TABLET: 8.6; 5 TABLET ORAL at 08:50

## 2020-05-25 RX ADMIN — VANCOMYCIN HYDROCHLORIDE 2000 MG: 10 INJECTION, POWDER, LYOPHILIZED, FOR SOLUTION INTRAVENOUS at 08:50

## 2020-05-25 RX ADMIN — CHLORTHALIDONE 100 MG: 25 TABLET ORAL at 08:50

## 2020-05-25 NOTE — PROGRESS NOTES
Problem: Mobility Impaired (Adult and Pediatric)  Goal: *Acute Goals and Plan of Care (Insert Text)  Description: LTG:  (1.)Mr. Jenifer Mistry will move from supine to sit and sit to supine, scoot up and down and roll side to side INDEPENDENTLY with bed flat within 7 treatment day(s). (2.)Mr. Jenifer Mistry will transfer from bed to chair and chair to bed MODIFIED INDEPENDENCE maintaining L NWB status within 7 treatment day(s). (3.)Mr. Jenifer Mistry will ambulate with MODIFIED INDEPENDENCE for 15 feet maintaining L NWB status with the least restrictive device within 7 treatment day(s). ________________________________________________________________________________________________   Outcome: Progressing Towards Goal     PHYSICAL THERAPY: Initial Assessment and PM 5/25/2020  INPATIENT:    [place] / [place] / Product Type: Medicare /       NAME/AGE/GENDER: El Flannery is a 75 y.o. male   PRIMARY DIAGNOSIS: Deep postoperative wound infection [T81.42XA] Postoperative wound infection Postoperative wound infection  Procedure(s) (LRB):  LEFT ANKLE INCISION AND DEBRIDEMENT/ CHOICE/ ROOM  735 PT BEING AMITTED 5/21 (Left)  3 Days Post-Op  ICD-10: Treatment Diagnosis:    Generalized Muscle Weakness (M62.81)  Difficulty in walking, Not elsewhere classified (R26.2)  Other abnormalities of gait and mobility (R26.89)   Precaution/Allergies:  Patient has no known allergies. ASSESSMENT:     Mr. Jenifer Mistry is a 75year old male who is seen s/p left ankle I&D; he is NWB per ortho orders however states he may WBAT in boot. Pt does have a boot however states it is uncomfortable and ill fitting and he does not want to wear. He lives alone in his car and is apparently functional from car per case management note. Pt presents in supine without complaints and is agreeable to therapy evaluation, mobility. Transfers to sitting independently. Reviewed NWB status and gait training with RW.  Transfers and performs mobility in room x6 ft with CGA/SBA using RW. Pt with fair to fair+ standing balance using walker and does c/o pain in right LE with weight bearing. Up to chair after activity and positioned comfortably with needs in reach, legs elevated. RN notified. Brian Subramanian is functioning only slightly below baseline with mobility, strength, balance, and gait safety and will benefit from continued PT to address deficits/maximize independence with mobility. Dc needs TBD pending progress- pt does have walker already, does not want to use boot, and maintains good compliance with NWB status. This section established at most recent assessment   PROBLEM LIST (Impairments causing functional limitations):  Decreased Strength  Decreased Transfer Abilities  Decreased Ambulation Ability/Technique  Decreased Balance  Increased Pain  Decreased Activity Tolerance  Decreased Skin Integrity/Hygeine  Decreased Cognition   INTERVENTIONS PLANNED: (Benefits and precautions of physical therapy have been discussed with the patient.)  Balance Exercise  Bed Mobility  Gait Training  Home Exercise Program (HEP)  Therapeutic Activites  Therapeutic Exercise/Strengthening  Transfer Training     TREATMENT PLAN: Frequency/Duration: daily for duration of hospital stay  Rehabilitation Potential For Stated Goals: Good     REHAB RECOMMENDATIONS (at time of discharge pending progress):    Placement: It is my opinion, based on this patient's performance to date, that Mr. Yaneli Elaine may benefit from [address] after discharge due to the functional deficits listed above that are likely to improve with skilled rehabilitation because he/she has multiple medical issues that affect his/her functional mobility in the community.   Equipment:   None; has RW               HISTORY:   History of Present Injury/Illness (Reason for Referral):  Per H&P, Martine Pinto is a 75 y.o. male who had open reduction internal fixation of his left ankle fracture at an outside institution several months ago. Instead of going back to that institution he came to our several weeks ago with an open wound in his left ankle. I treated this with debridement of his wound and hardware removal.  He has missed a couple follow-up appointments and then followed up yesterday with drainage from his left ankle so he was admitted for repeat debridement\"    Past Medical History/Comorbidities:   Mr. Hernan Bear  has a past medical history of Stroke [facility]). Mr. Hernan Bear  has a past surgical history that includes hx orthopaedic. Social History/Living Environment:   Home Environment: Other (comment)(lives in car)  One/Two Story Residence: Other (Comment)  Living Alone: Yes  Support Systems: Other (comments)(\"I have people\")  Patient Expects to be Discharged to[de-identified] Unknown  Current DME Used/Available at Home: Walker, rolling  Prior Level of Function/Work/Activity:  Lives alone in his car. States he has rolling walker and has been NWB for months.       Number of Personal Factors/Comorbidities that affect the Plan of Care: 3+: HIGH COMPLEXITY   EXAMINATION:   Most Recent Physical Functioning:   Gross Assessment:  AROM: Within functional limits  Strength: Generally decreased, functional  Coordination: Within functional limits               Posture:  Posture (WDL): Within defined limits  Balance:  Sitting: Intact  Standing: Impaired  Standing - Static: Fair  Standing - Dynamic : Fair Bed Mobility:  Rolling: Independent  Supine to Sit: Independent  Scooting: Independent  Wheelchair Mobility:     Transfers:  Sit to Stand: Stand-by assistance  Stand to Sit: Stand-by assistance  Bed to Chair: Stand-by assistance  Gait:  Left Side Weight Bearing: Non-weight bearing  Base of Support: Center of gravity altered;Shift to right  Speed/Marla: Pace decreased (<100 feet/min)  Step Length: Right shortened  Gait Abnormalities: Trunk sway increased;Decreased step clearance  Distance (ft): 6 Feet (ft)  Assistive Device: Rubie Mcardle, rolling;Gait belt  Ambulation - Level of Assistance: Contact guard assistance;Stand-by assistance  Interventions: Verbal cues; Safety awareness training      Body Structures Involved:  Bones  Joints  Muscles Body Functions Affected:  Mental  Sensory/Pain  Movement Related Activities and Participation Affected:  General Tasks and [address], Social and [place]   Number of elements that affect the Plan of Care: 4+: HIGH COMPLEXITY   CLINICAL PRESENTATION:   Presentation: Stable and uncomplicated: LOW COMPLEXITY   CLINICAL DECISION MAKIN [facility] Inpatient Short Form  How much difficulty does the patient currently have. .. Unable A Lot A Little None   1. Turning over in bed (including adjusting bedclothes, sheets and blankets)? [] 1   [] 2   [] 3   [x] 4   2. Sitting down on and standing up from a chair with arms ( e.g., wheelchair, bedside commode, etc.)   [] 1   [] 2   [] 3   [x] 4   3. Moving from lying on back to sitting on the side of the bed? [] 1   [] 2   [] 3   [x] 4   How much help from another person does the patient currently need. .. Total A Lot A Little None   4. Moving to and from a bed to a chair (including a wheelchair)? [] 1   [] 2   [x] 3   [] 4   5. Need to walk in hospital room? [] 1   [x] 2   [] 3   [] 4   6. Climbing 3-5 steps with a railing? [] 1   [x] 2   [] 3   [] 4   © , Trustees of [address] [place] [address], under license to [employer]. All rights reserved      Score:  Initial: 19 Most Recent: X (Date: -- )    Interpretation of Tool:  Represents activities that are increasingly more difficult (i.e. Bed mobility, Transfers, Gait). Medical Necessity:     Patient demonstrates   good   rehab potential due to higher previous functional level.   Reason for Services/Other Comments:  Patient   continues to demonstrate capacity to improve strength, mobility, balance, transfers, and activity tolerance which will   increase independence, decrease amount of assistance required from caregiver, and increase safety  . Use of outcome tool(s) and clinical judgement create a POC that gives a: Clear prediction of patient's progress: LOW COMPLEXITY            TREATMENT:   (In addition to Assessment/Re-Assessment sessions the following treatments were rendered)   Pre-treatment Symptoms/Complaints:  \"thank you\"  Pain: Initial:   Pain Intensity 1: 0  Post Session:  0/10     Assessment/Reassessment only, no treatment provided today    Braces/Orthotics/Lines/Etc:   O2 Device: Room air  Treatment/Session Assessment:    Response to Treatment:  pt performs mobility with CGA/SBA in room using walker, good compliance with L NWB  Interdisciplinary Collaboration:   Physical Therapist  Registered Nurse  After treatment position/precautions:   Up in chair  Bed/Chair-wheels locked  Bed in low position  Call light within reach  RN notified   Compliance with Program/Exercises: Will assess as treatment progresses  Recommendations/Intent for next treatment session: \"Next visit will focus on advancements to more challenging activities and reduction in assistance provided\".   Total Treatment Duration:  PT Patient Time In/Time Out  Time In: 2395  Time Out: Ace Escobedo 67, DPT

## 2020-05-25 NOTE — PROGRESS NOTES
Progress Note    Patient: Juan Vigil MRN: [id number]  SSN: [id number]    YOB: 1955  Age: 75 y.o. Sex: male      Admit Date: 5/21/2020    LOS: 4 days     Subjective:     Patient says he thinks his pain is getting better in his left ankle    Objective:     Vitals:    05/24/20 2017 05/25/20 0023 05/25/20 0340 05/25/20 0732   BP: 144/72 144/89 (!) 177/100    Pulse: 68 86 88    Resp: 18 18 18    Temp: 97.7 °F (36.5 °C) 97.5 °F (36.4 °C) 98.1 °F (36.7 °C)    SpO2: 97% 97% 98% 98%   Weight:       Height:            Intake and Output:  Current Shift: No intake/output data recorded. Last three shifts: 05/23 1901 - 05/25 0700  In: -   Out: 3514 [Urine:1550]    alert and oriented to at least person  Skin - dressing clean dry and intact  Motor and sensory function intact in LEFT LOWER extremity  Pulses palpable in LEFT LOWER extremity       Lab/Data Review:  CBC: No results found for: WBC, HGB, HGBEXT, HCT, HCTEXT, PLT, PLTEXT, HGBEXT, HCTEXT, PLTEXT       Assessment:    POD 3 from left ankle I&D    Plan:     Continue IV vancomycin for now, up with PT      he probably needs to be nonweightbearing for now. If we can manage to find him a walking boot and he can weight-bear as tolerated in the boot.     Signed By: Shelby Henson MD     May 25, 2020

## 2020-05-25 NOTE — PROGRESS NOTES
Pharmacokinetic Consult to Pharmacist    Leeroy Loreto is a 75 y.o. male being treated for a bone and joint infection with vancomycin. Height: 6' 1\" (185.4 cm)  Weight: 81.6 kg (180 lb)  Lab Results   Component Value Date/Time    BUN 14 05/21/2020 08:43 PM    Creatinine 1.02 05/21/2020 08:43 PM    WBC 5.5 05/21/2020 08:43 PM    Procalcitonin <0.05 04/26/2020 04:00 PM    Lactic acid 1.5 04/26/2020 04:07 PM      Estimated Creatinine Clearance: 80.5 mL/min (by C-G formula based on SCr of 1.02 mg/dL). CULTURES:  Results     ** No results found for the last 336 hours. **            Lab Results   Component Value Date/Time    Vancomycin,trough 11.3 05/01/2020 02:17 AM       Day 1 of vancomycin. Goal trough is 15-20. Will initiate with a 2000mg loading dose followed by 1250mg IV q 12 h to target goal trough. Will continue to follow patient.       Thank you,  Yari SimmonsD, BCPS  Pharmacist  [phone]

## 2020-05-25 NOTE — PROGRESS NOTES
Hospitalist Progress Note    Patient: Samira Moreau MRN: [id number]  SSN: [id number]    YOB: 1955  Age: 75 y.o. Sex: male      Admit Date: 5/21/2020    LOS: 4 days     Subjective:     75 y.o. male who lives in his car who is being seen for HTN and medical management.   He was admitted by Orthopedics due to left ankle wound needing debridement. He had hardware removed and was discharged on IV antibiotics on 5/11/20. He has been non-compliant, not following up at the infusion center nor at appointments with Orthopedics. He then presented to the ER and was found to have a possibly infected wound. He was taken to the OR for debridement on 5/22/20.    5/25 - States his ankle pain is improved. Asking when he is going to be able to \"see other landscapes\". Now taking pain medications. Denies CP/SOB. Review of systems negative except stated above. Objective:     Visit Vitals  /80 (BP 1 Location: Left arm, BP Patient Position: Sitting)   Pulse 100   Temp 97.8 °F (36.6 °C)   Resp 20   Ht 6' 1\" (1.854 m)   Wt 81.6 kg (180 lb)   SpO2 96%   BMI 23.75 kg/m²      Oxygen Therapy  O2 Sat (%): 96 % (05/25/20 0758)  Pulse via Oximetry: 89 beats per minute (05/25/20 0732)  O2 Device: Room air (05/25/20 0732)      Intake and Output:     Intake/Output Summary (Last 24 hours) at 5/25/2020 0946  Last data filed at 5/25/2020 0854  Gross per 24 hour   Intake 240 ml   Output 1650 ml   Net -1410 ml         Physical Exam:   GENERAL: alert, cooperative, no distress, appears stated age  EYE: conjunctivae/corneas clear. PERRL. THROAT & NECK: normal and no erythema or exudates noted. LUNG: clear to auscultation bilaterally  HEART: regular rate and rhythm, S1S2, no murmur, no JVD  ABDOMEN: soft, non-tender, non-distended. Bowel sounds normal.   EXTREMITIES:  Left ankle in ACE wrap, 2+ pedal/radial pulses bilaterally  SKIN: no rash or abnormalities  NEUROLOGIC: A&Ox3. Cranial nerves 2-12 grossly intact.     Derryl Ines Review:  No results found for this or any previous visit (from the past 24 hour(s)). Imaging:  Xr Tib/fib Lt    Result Date: 5/21/2020  Exam:  Left tibia fibula radiographs History:  pain, s/p hardware removal left ankle, 75 years Male Comparison: Left tibia fibula radiographs April 26, 2020 , left ankle radiographs April 29, 2020 Findings: Again seen are ghost tracks through the distal tibia and fibula. Comminuted fracture fragments are seen of the distal fibular metadiaphysis which appear new since the prior ankle films, likely representing new acute nondisplaced fractures. Persistent mild diffuse osteopenia. Visualized soft tissues otherwise unremarkable. Impression: Findings likely represent new acute comminuted nondisplaced fracture of the distal fibular metadiaphysis, superimposed on a chronic fracture deformity. Xr Tib/fib Lt    Result Date: 4/26/2020  LEFT TIB-FIB 2 view(s). HISTORY: Cellulitis and wound. TECHNIQUE: AP and lateral views. COMPARISON: None. FINDINGS: There is a lateral plate and screws transfixing the distal fibula and distal tibia. No periostitis or erosions. There is soft tissue thickening. No soft tissue gas. IMPRESSION: Postop changes and soft tissue thickening. No periostitis. LEFT ANKLE 3 VIEW(S). HISTORY: Pain and swelling. TECHNIQUE: AP and lateral and oblique views. COMPARISON: None. FINDINGS: Old healed distal fibular fracture. There is lateral plate with distal screws transfixing the fibula and tibia. There is overlying soft tissue swelling with tiny densities, possibly metallic. No periostitis. IMPRESSION: Soft tissue swelling. Postop changes. No periostitis. Xr Ankle Lt Min 3 V    Result Date: 4/29/2020  Left ankle radiographs 4/29/2020 CLINICAL HISTORY: Heart removal and incision/drainage. COMPARISON: Left ankle radiographs 4/26/2020 FINDINGS: 3 views of the left ankle are submitted for evaluation.  Plain film assessment is limited by portable technique, patient motion, and splint artifact. There appears to be interval removal of prior metallic hardware stabilizing the tibiofibular syndesmosis and distal fibula. Tiny faint residual metallic densities are seen which were present on the prior examination. Additional wire-like densities in gauge the mid tarsal bones which were present on the prior examination. No acute appearing fracture line, or evidence for dislocation is seen. Mild soft tissue gas is seen which is not clearly abnormal given the recent surgery. Persistent soft tissue edema is which may be improved although this may be due to placement of a splint. IMPRESSION: 1. Hardware removal as described above. Xr Ankle Lt Min 3 V    Result Date: 4/26/2020  LEFT TIB-FIB 2 view(s). HISTORY: Cellulitis and wound. TECHNIQUE: AP and lateral views. COMPARISON: None. FINDINGS: There is a lateral plate and screws transfixing the distal fibula and distal tibia. No periostitis or erosions. There is soft tissue thickening. No soft tissue gas. IMPRESSION: Postop changes and soft tissue thickening. No periostitis. LEFT ANKLE 3 VIEW(S). HISTORY: Pain and swelling. TECHNIQUE: AP and lateral and oblique views. COMPARISON: None. FINDINGS: Old healed distal fibular fracture. There is lateral plate with distal screws transfixing the fibula and tibia. There is overlying soft tissue swelling with tiny densities, possibly metallic. No periostitis. IMPRESSION: Soft tissue swelling. Postop changes. No periostitis. No results found for this visit on 05/21/20. Cultures:   All Micro Results     None          Assessment/Plan:     Principal Problem:    Postoperative wound infection (5/21/2020)  - S/P excisional debridement of left ankle wounds with debridement of skin subcutaneous tissue muscle and bone area  - No antibiotics  - Currently in lots of pain     Active Problems:    Deep postoperative wound infection (5/21/2020)  - S/P excisional debridement of left ankle wounds with debridement of skin subcutaneous tissue muscle and bone area  - No antibiotics  - Currently in lots of pain       High blood pressure (5/22/2020)  - Continues to be elevated but improved  - I suspect this is undiagnosed HTN and extreme pain after surgery  - Had high BP on previous admission  - Increased Chlorthalidone 50mg to 100mg daily -- will continue  - Continue PRN Hydralazine       Non-compliant patient (5/22/2020)  - Patient already asking to go \"home\" to his car  - Missed multiple appts  - Did not follow up at infusion center for antibiotics     Thank you for allowing us to participate in the care of this patient. We will follow along with you.       Signed By: Cecily Grijalva DO     May 25, 2020

## 2020-05-26 VITALS
HEIGHT: 73 IN | BODY MASS INDEX: 23.86 KG/M2 | DIASTOLIC BLOOD PRESSURE: 99 MMHG | WEIGHT: 180 LBS | HEART RATE: 93 BPM | OXYGEN SATURATION: 98 % | RESPIRATION RATE: 18 BRPM | SYSTOLIC BLOOD PRESSURE: 172 MMHG | TEMPERATURE: 98.1 F

## 2020-05-26 PROCEDURE — 74011250637 HC RX REV CODE- 250/637: Performed by: PHYSICIAN ASSISTANT

## 2020-05-26 PROCEDURE — 74011250637 HC RX REV CODE- 250/637: Performed by: ORTHOPAEDIC SURGERY

## 2020-05-26 PROCEDURE — 97530 THERAPEUTIC ACTIVITIES: CPT

## 2020-05-26 PROCEDURE — 74011250636 HC RX REV CODE- 250/636: Performed by: ORTHOPAEDIC SURGERY

## 2020-05-26 PROCEDURE — 74011250637 HC RX REV CODE- 250/637: Performed by: INTERNAL MEDICINE

## 2020-05-26 RX ADMIN — Medication 10 ML: at 05:28

## 2020-05-26 RX ADMIN — Medication 1 AMPULE: at 08:44

## 2020-05-26 RX ADMIN — SENNOSIDES AND DOCUSATE SODIUM 1 TABLET: 8.6; 5 TABLET ORAL at 08:44

## 2020-05-26 RX ADMIN — CHLORTHALIDONE 100 MG: 25 TABLET ORAL at 08:44

## 2020-05-26 RX ADMIN — VANCOMYCIN HYDROCHLORIDE 1250 MG: 10 INJECTION, POWDER, LYOPHILIZED, FOR SOLUTION INTRAVENOUS at 08:49

## 2020-05-26 RX ADMIN — Medication 10 ML: at 12:24

## 2020-05-26 NOTE — PROGRESS NOTES
Initial visit by  to convey care and concern and encourage patient that  services are available if desired. Mr. Jose Baum appeared to be sleeping and I did not wake him. Chaplains remain available for follow-up at patient's convenience.      Robbi Nash, [address]  Board Certified

## 2020-05-26 NOTE — PROGRESS NOTES
Problem: Mobility Impaired (Adult and Pediatric)  Goal: *Acute Goals and Plan of Care (Insert Text)  Description: LTG:  (1.)Mr. Aislinn Farrar will move from supine to sit and sit to supine, scoot up and down and roll side to side INDEPENDENTLY with bed flat within 7 treatment day(s). (2.)Mr. Aislinn Farrar will transfer from bed to chair and chair to bed MODIFIED INDEPENDENCE maintaining L NWB status within 7 treatment day(s). (3.)Mr. Aislinn Farrar will ambulate with MODIFIED INDEPENDENCE for 15 feet maintaining L NWB status with the least restrictive device within 7 treatment day(s). ________________________________________________________________________________________________   Outcome: Progressing Towards Goal     PHYSICAL THERAPY: Daily Note and AM 5/26/2020  INPATIENT: PT Visit Days : 1  [place] / [place] / Product Type: Medicare /       NAME/AGE/GENDER: Linda Burciaga is a 75 y.o. male   PRIMARY DIAGNOSIS: Deep postoperative wound infection [T81.42XA] Postoperative wound infection Postoperative wound infection  Procedure(s) (LRB):  LEFT ANKLE INCISION AND DEBRIDEMENT/ CHOICE/ ROOM  735 PT BEING AMITTED 5/21 (Left)  4 Days Post-Op  ICD-10: Treatment Diagnosis:    · Generalized Muscle Weakness (M62.81)  · Difficulty in walking, Not elsewhere classified (R26.2)  · Other abnormalities of gait and mobility (R26.89)   Precaution/Allergies:  Patient has no known allergies. ASSESSMENT:     Mr. Aislinn Farrar is a 75year old male who is seen s/p left ankle I&D; he is NWB per ortho orders however states he may WBAT in boot. Pt does have a boot however states it is uncomfortable and ill fitting and he does not want to wear. He lives alone in his car and is apparently functional from car per case management note. Patient was supine upon contact and agreeable to PT. Patient performs supine to sit independently and transfer to standing with SBA.  Patient states he needs to [facility] on this foot to make it better\". Max education provided to patient on NWB status and healing. Patient verbalizes understanding but does need occasional cues to maintain throughout ambulation. Patient ambulates 250' with rolling walker, CGA and cues for sequencing. Patient is very talkative throughout treatment and needs occasional cues to stay on task. Patient also takes several standing rest breaks due to fatigue. Patient returns to EOB and to supine independently. Overall good progress towards physical therapy goals. Patient's goals listed above are still appropriate. Will continue skilled PT to address remaining deficits. This section established at most recent assessment   PROBLEM LIST (Impairments causing functional limitations):  1. Decreased Strength  2. Decreased Transfer Abilities  3. Decreased Ambulation Ability/Technique  4. Decreased Balance  5. Increased Pain  6. Decreased Activity Tolerance  7. Decreased Skin Integrity/Hygeine  8. Decreased Cognition   INTERVENTIONS PLANNED: (Benefits and precautions of physical therapy have been discussed with the patient.)  1. Balance Exercise  2. Bed Mobility  3. Gait Training  4. Home Exercise Program (HEP)  5. Therapeutic Activites  6. Therapeutic Exercise/Strengthening  7. Transfer Training     TREATMENT PLAN: Frequency/Duration: daily for duration of hospital stay  Rehabilitation Potential For Stated Goals: Good     REHAB RECOMMENDATIONS (at time of discharge pending progress):    Placement: It is my opinion, based on this patient's performance to date, that Mr. Jose Baum may benefit from [address] after discharge due to the functional deficits listed above that are likely to improve with skilled rehabilitation because he/she has multiple medical issues that affect his/her functional mobility in the community.   Equipment:    None; has RW               HISTORY:   History of Present Injury/Illness (Reason for Referral):  Per H&P, Ernestina Mcintyre is a 75 y.o. male who had open reduction internal fixation of his left ankle fracture at an outside institution several months ago. Instead of going back to that institution he came to our several weeks ago with an open wound in his left ankle. I treated this with debridement of his wound and hardware removal.  He has missed a couple follow-up appointments and then followed up yesterday with drainage from his left ankle so he was admitted for repeat debridement\"    Past Medical History/Comorbidities:   Mr. Darian Freeman  has a past medical history of Stroke [facility]). Mr. Darian Freeman  has a past surgical history that includes hx orthopaedic. Social History/Living Environment:   Home Environment: Other (comment)(lives in car)  One/Two Story Residence: Other (Comment)  Living Alone: Yes  Support Systems: Other (comments)(\"I have people\")  Patient Expects to be Discharged to[de-identified] Unknown  Current DME Used/Available at Home: Walker, rolling  Prior Level of Function/Work/Activity:  Lives alone in his car. States he has rolling walker and has been NWB for months. Number of Personal Factors/Comorbidities that affect the Plan of Care: 3+: HIGH COMPLEXITY   EXAMINATION:   Most Recent Physical Functioning:   Gross Assessment:                  Posture:     Balance:  Sitting: Intact  Standing: Impaired  Standing - Static: Good  Standing - Dynamic : Fair Bed Mobility:  Rolling: Independent  Supine to Sit: Independent  Sit to Supine: Independent  Scooting: Independent  Wheelchair Mobility:     Transfers:  Sit to Stand: Stand-by assistance  Stand to Sit: Stand-by assistance  Gait:  Left Side Weight Bearing: Non-weight bearing  Base of Support: Center of gravity altered;Shift to right  Speed/Marla: Pace decreased (<100 feet/min); Slow  Step Length: Right shortened  Gait Abnormalities: Decreased step clearance; Step to gait  Distance (ft): 250 Feet (ft)  Assistive Device: Walker, rolling;Gait belt  Ambulation - Level of Assistance: Contact guard assistance  Interventions: Safety awareness training; Tactile cues; Verbal cues      Body Structures Involved:  1. Bones  2. Joints  3. Muscles Body Functions Affected:  1. Mental  2. Sensory/Pain  3. Movement Related Activities and Participation Affected:  1. General Tasks and Demands  2. Mobility  3. Domestic Life  4. Community, Social and [place]   Number of elements that affect the Plan of Care: 4+: HIGH COMPLEXITY   CLINICAL PRESENTATION:   Presentation: Stable and uncomplicated: LOW COMPLEXITY   CLINICAL DECISION MAKIN [facility] Inpatient Short Form  How much difficulty does the patient currently have. .. Unable A Lot A Little None   1. Turning over in bed (including adjusting bedclothes, sheets and blankets)? [] 1   [] 2   [] 3   [x] 4   2. Sitting down on and standing up from a chair with arms ( e.g., wheelchair, bedside commode, etc.)   [] 1   [] 2   [] 3   [x] 4   3. Moving from lying on back to sitting on the side of the bed? [] 1   [] 2   [] 3   [x] 4   How much help from another person does the patient currently need. .. Total A Lot A Little None   4. Moving to and from a bed to a chair (including a wheelchair)? [] 1   [] 2   [x] 3   [] 4   5. Need to walk in hospital room? [] 1   [x] 2   [] 3   [] 4   6. Climbing 3-5 steps with a railing? [] 1   [x] 2   [] 3   [] 4   © , Trustees of [address] [place] [address], under license to [employer]. All rights reserved      Score:  Initial: 19 Most Recent: X (Date: -- )    Interpretation of Tool:  Represents activities that are increasingly more difficult (i.e. Bed mobility, Transfers, Gait). Medical Necessity:     · Patient demonstrates   · good  ·  rehab potential due to higher previous functional level.   Reason for Services/Other Comments:  · Patient   · continues to demonstrate capacity to improve strength, mobility, balance, transfers, and activity tolerance which will   · increase independence, decrease amount of assistance required from caregiver, and increase safety  · . Use of outcome tool(s) and clinical judgement create a POC that gives a: Clear prediction of patient's progress: LOW COMPLEXITY            TREATMENT:   (In addition to Assessment/Re-Assessment sessions the following treatments were rendered)   Pre-treatment Symptoms/Complaints: None  Pain: Initial:   Pain Intensity 1: 0  Post Session:  0/10     Therapeutic Activity: (    30 Minutes): Therapeutic activities including bed mobility training, transfer training, ambulation on level ground, instruction in NWB status and sequencing with rolling walker, static/dynamic standing balance, posture training, and patient education to improve mobility, strength and balance. Required minimal Safety awareness training; Tactile cues; Verbal cues to promote static and dynamic balance in standing and promote coordination of bilateral, lower extremity(s). Braces/Orthotics/Lines/Etc:   · O2 Device: Room air  Treatment/Session Assessment:    · Response to Treatment: See above  · Interdisciplinary Collaboration:   o Physical Therapy Assistant  o Registered Nurse  · After treatment position/precautions:   o Supine in bed  o Bed alarm/tab alert on  o Bed/Chair-wheels locked  o Bed in low position  o Call light within reach  o RN notified   · Compliance with Program/Exercises: Will assess as treatment progresses  · Recommendations/Intent for next treatment session: \"Next visit will focus on advancements to more challenging activities and reduction in assistance provided\".   Total Treatment Duration:  PT Patient Time In/Time Out  Time In: 0918  Time Out: [address] PTA

## 2020-05-26 NOTE — PROGRESS NOTES
May 26, 2020         Post Op day: 4 Days Post-Op Procedure(s) (LRB):  LEFT ANKLE INCISION AND DEBRIDEMENT/ CHOICE/ ROOM  735 PT BEING AMITTED  (Left)      Admit Date: 2020  Admit Diagnosis: Deep postoperative wound infection [T81.42XA]       Principle Problem: Postoperative wound infection. Subjective: Doing ok, pain is better, No complaints, No SOB, No Chest Pain, No Nausea or Vomiting     Objective:   Vital Signs are Stable, No Acute Distress, Alert, Wound vac in place. Neurovascular exam is normal.     Assessment / Plan :  Patient Active Problem List   Diagnosis Code    Sepsis ([facility] [place] [address] A41.9    Infected blister of left ankle S90.522A, L08.9    Osteomyelitis ([facility] [place] [address] M86.9    Postoperative wound infection T81.49XA    Deep postoperative wound infection T81.42XA    High blood pressure I10    Non-compliant patient Z91.19      Patient Vitals for the past 8 hrs:   BP Temp Pulse Resp SpO2   20 0735 125/79 97.5 °F (36.4 °C) 78 18 100 %   20 0420 127/84 98.2 °F (36.8 °C) 84 16 99 %    Temp (24hrs), Av.8 °F (36.6 °C), Min:97.2 °F (36.2 °C), Max:98.2 °F (36.8 °C)    Body mass index is 23.75 kg/m². Lab Results   Component Value Date/Time    HGB 10.2 (L) 2020 08:43 PM        S/P I & D left lower leg: Non WB, continue Vancomycin, will discuss with Dr. Leticia Renee Mgmt per hospitalist  Fall Precuations  DC disp: DC when wound care and antibiotics are arranged.  Patient has been non-compliant after previous discharge and appears not to have the means to provide for his own care and well being and wound benefit from rehab placement if possible        Signed By: ROMA Pressley  2020,  8:17 AM

## 2020-05-26 NOTE — PROGRESS NOTES
Hospitalist Progress Note    Patient: Concepción Johnson MRN: [id number]  SSN: [id number]    YOB: 1955  Age: 75 y.o. Sex: male      Admit Date: 5/21/2020    LOS: 5 days     Subjective:     75 y.o. male who lives in his car who is being seen for HTN and medical management.   He was admitted by Orthopedics due to left ankle wound needing debridement. He had hardware removed and was discharged on IV antibiotics on 5/11/20. He has been non-compliant, not following up at the infusion center nor at appointments with Orthopedics. He then presented to the ER and was found to have a possibly infected wound. He was taken to the OR for debridement on 5/22/20.    5/26 patient states that his pain in the right ankle is improved. No fever. No chills,. No nausea, no vomiting. Review of systems negative except stated above. Objective:     Visit Vitals  BP (!) 172/99 (BP 1 Location: Left arm, BP Patient Position: Sitting) Comment (BP Patient Position): pt sitting on side of bed   Pulse 93   Temp 98.1 °F (36.7 °C)   Resp 18   Ht 6' 1\" (1.854 m)   Wt 81.6 kg (180 lb)   SpO2 98%   BMI 23.75 kg/m²      Oxygen Therapy  O2 Sat (%): 98 % (05/26/20 1524)  Pulse via Oximetry: 89 beats per minute (05/25/20 0732)  O2 Device: Room air (05/25/20 1531)      Intake and Output:     Intake/Output Summary (Last 24 hours) at 5/26/2020 1552  Last data filed at 5/26/2020 0735  Gross per 24 hour   Intake 480 ml   Output 1025 ml   Net -545 ml         Physical Exam:   GENERAL: alert, cooperative, no distress, appears stated age  EYE: conjunctivae/corneas clear. PERRL. THROAT & NECK: normal and no erythema or exudates noted. LUNG: clear to auscultation bilaterally  HEART: regular rate and rhythm, S1S2, no murmur, no JVD  ABDOMEN: soft, non-tender, non-distended. Bowel sounds normal.   EXTREMITIES:  Left ankle in ACE wrap, 2+ pedal/radial pulses bilaterally  SKIN: no rash or abnormalities  NEUROLOGIC: A&Ox3.  Cranial nerves 2-12 grossly intact. Lab/Data Review:  No results found for this or any previous visit (from the past 24 hour(s)). Imaging:  Xr Tib/fib Lt    Result Date: 5/21/2020  Exam:  Left tibia fibula radiographs History:  pain, s/p hardware removal left ankle, 75 years Male Comparison: Left tibia fibula radiographs April 26, 2020 , left ankle radiographs April 29, 2020 Findings: Again seen are ghost tracks through the distal tibia and fibula. Comminuted fracture fragments are seen of the distal fibular metadiaphysis which appear new since the prior ankle films, likely representing new acute nondisplaced fractures. Persistent mild diffuse osteopenia. Visualized soft tissues otherwise unremarkable. Impression: Findings likely represent new acute comminuted nondisplaced fracture of the distal fibular metadiaphysis, superimposed on a chronic fracture deformity. Xr Tib/fib Lt    Result Date: 4/26/2020  LEFT TIB-FIB 2 view(s). HISTORY: Cellulitis and wound. TECHNIQUE: AP and lateral views. COMPARISON: None. FINDINGS: There is a lateral plate and screws transfixing the distal fibula and distal tibia. No periostitis or erosions. There is soft tissue thickening. No soft tissue gas. IMPRESSION: Postop changes and soft tissue thickening. No periostitis. LEFT ANKLE 3 VIEW(S). HISTORY: Pain and swelling. TECHNIQUE: AP and lateral and oblique views. COMPARISON: None. FINDINGS: Old healed distal fibular fracture. There is lateral plate with distal screws transfixing the fibula and tibia. There is overlying soft tissue swelling with tiny densities, possibly metallic. No periostitis. IMPRESSION: Soft tissue swelling. Postop changes. No periostitis. Xr Ankle Lt Min 3 V    Result Date: 4/29/2020  Left ankle radiographs 4/29/2020 CLINICAL HISTORY: Heart removal and incision/drainage. COMPARISON: Left ankle radiographs 4/26/2020 FINDINGS: 3 views of the left ankle are submitted for evaluation.  Plain film assessment is limited by portable technique, patient motion, and splint artifact. There appears to be interval removal of prior metallic hardware stabilizing the tibiofibular syndesmosis and distal fibula. Tiny faint residual metallic densities are seen which were present on the prior examination. Additional wire-like densities in gauge the mid tarsal bones which were present on the prior examination. No acute appearing fracture line, or evidence for dislocation is seen. Mild soft tissue gas is seen which is not clearly abnormal given the recent surgery. Persistent soft tissue edema is which may be improved although this may be due to placement of a splint. IMPRESSION: 1. Hardware removal as described above. Xr Ankle Lt Min 3 V    Result Date: 4/26/2020  LEFT TIB-FIB 2 view(s). HISTORY: Cellulitis and wound. TECHNIQUE: AP and lateral views. COMPARISON: None. FINDINGS: There is a lateral plate and screws transfixing the distal fibula and distal tibia. No periostitis or erosions. There is soft tissue thickening. No soft tissue gas. IMPRESSION: Postop changes and soft tissue thickening. No periostitis. LEFT ANKLE 3 VIEW(S). HISTORY: Pain and swelling. TECHNIQUE: AP and lateral and oblique views. COMPARISON: None. FINDINGS: Old healed distal fibular fracture. There is lateral plate with distal screws transfixing the fibula and tibia. There is overlying soft tissue swelling with tiny densities, possibly metallic. No periostitis. IMPRESSION: Soft tissue swelling. Postop changes. No periostitis. No results found for this visit on 05/21/20. Cultures:   All Micro Results     None          Assessment/Plan:     Principal Problem:    Postoperative wound infection (5/21/2020)  - S/P excisional debridement of left ankle wounds with debridement of skin subcutaneous tissue muscle and bone area  - No antibiotics  - Currently in lots of pain     Active Problems:    Deep postoperative wound infection (5/21/2020)  - S/P excisional debridement of left ankle wounds with debridement of skin subcutaneous tissue muscle and bone area  - No antibiotics  - Currently in lots of pain       Hypertension (5/22/2020)  - Chlorthalidone 100 mg daily will continue.   - Continue PRN Hydralazine       Non-compliant patient (5/22/2020)  - Patient already asking to go \"home\" to his car  - Missed multiple appts     Thank you for allowing us to participate in the care of this patient. We will follow along with you. DISPO: Per primary Orthopedics. Addendum:  patient just left AMA this afternoon.       Signed By: Abril Zavala MD     May 26, 2020

## 2020-05-29 NOTE — DISCHARGE SUMMARY
Patient left AGAINST MEDICAL ADVICE. I had recommended that he continue receiving IV antibiotics here and then after a period of IV antibiotics hopefully he can switch over to oral antibiotics.   I am hopeful we can get him some sort of follow-up but he has not been very compliant with follow-up in the past

## 2021-07-12 ENCOUNTER — HOSPITAL ENCOUNTER (INPATIENT)
Age: 66
LOS: 2 days | Discharge: HOME HEALTH | DRG: 982 | End: 2021-07-16
Attending: INTERNAL MEDICINE | Admitting: INTERNAL MEDICINE
Payer: MEDICARE

## 2021-07-12 ENCOUNTER — APPOINTMENT (OUTPATIENT)
Dept: GENERAL RADIOLOGY | Age: 66
DRG: 982 | End: 2021-07-12
Attending: INTERNAL MEDICINE
Payer: MEDICARE

## 2021-07-12 DIAGNOSIS — T81.49XA POSTOPERATIVE WOUND INFECTION: Primary | ICD-10-CM

## 2021-07-12 PROBLEM — L03.90 CELLULITIS: Status: ACTIVE | Noted: 2021-07-12

## 2021-07-12 PROBLEM — Z72.0 TOBACCO ABUSE: Status: ACTIVE | Noted: 2021-07-12

## 2021-07-12 LAB
ANION GAP SERPL CALC-SCNC: 3 MMOL/L (ref 7–16)
BASOPHILS # BLD: 0 K/UL (ref 0–0.2)
BASOPHILS NFR BLD: 0 % (ref 0–2)
BUN SERPL-MCNC: 7 MG/DL (ref 8–23)
CALCIUM SERPL-MCNC: 9 MG/DL (ref 8.3–10.4)
CHLORIDE SERPL-SCNC: 109 MMOL/L (ref 98–107)
CO2 SERPL-SCNC: 28 MMOL/L (ref 21–32)
CREAT SERPL-MCNC: 0.78 MG/DL (ref 0.8–1.5)
CRP SERPL HS-MCNC: 53.3 MG/L
DIFFERENTIAL METHOD BLD: ABNORMAL
EOSINOPHIL # BLD: 0 K/UL (ref 0–0.8)
EOSINOPHIL NFR BLD: 0 % (ref 0.5–7.8)
ERYTHROCYTE [DISTWIDTH] IN BLOOD BY AUTOMATED COUNT: 13.8 % (ref 11.9–14.6)
ERYTHROCYTE [SEDIMENTATION RATE] IN BLOOD: 60 MM/HR (ref 0–20)
GLUCOSE SERPL-MCNC: 87 MG/DL (ref 65–100)
HCT VFR BLD AUTO: 35.3 % (ref 41.1–50.3)
HGB BLD-MCNC: 11.3 G/DL (ref 13.6–17.2)
IMM GRANULOCYTES # BLD AUTO: 0 K/UL (ref 0–0.5)
IMM GRANULOCYTES NFR BLD AUTO: 0 % (ref 0–5)
LYMPHOCYTES # BLD: 1.9 K/UL (ref 0.5–4.6)
LYMPHOCYTES NFR BLD: 21 % (ref 13–44)
MCH RBC QN AUTO: 28 PG (ref 26.1–32.9)
MCHC RBC AUTO-ENTMCNC: 32 G/DL (ref 31.4–35)
MCV RBC AUTO: 87.6 FL (ref 79.6–97.8)
MONOCYTES # BLD: 0.8 K/UL (ref 0.1–1.3)
MONOCYTES NFR BLD: 9 % (ref 4–12)
NEUTS SEG # BLD: 6.2 K/UL (ref 1.7–8.2)
NEUTS SEG NFR BLD: 70 % (ref 43–78)
NRBC # BLD: 0 K/UL (ref 0–0.2)
PLATELET # BLD AUTO: 204 K/UL (ref 150–450)
PMV BLD AUTO: 10.7 FL (ref 9.4–12.3)
POTASSIUM SERPL-SCNC: 4.4 MMOL/L (ref 3.5–5.1)
PROCALCITONIN SERPL-MCNC: <0.05 NG/ML
RBC # BLD AUTO: 4.03 M/UL (ref 4.23–5.6)
SODIUM SERPL-SCNC: 140 MMOL/L (ref 136–145)
WBC # BLD AUTO: 8.9 K/UL (ref 4.3–11.1)

## 2021-07-12 PROCEDURE — 84145 PROCALCITONIN (PCT): CPT

## 2021-07-12 PROCEDURE — 87040 BLOOD CULTURE FOR BACTERIA: CPT

## 2021-07-12 PROCEDURE — 36415 COLL VENOUS BLD VENIPUNCTURE: CPT

## 2021-07-12 PROCEDURE — 80048 BASIC METABOLIC PNL TOTAL CA: CPT

## 2021-07-12 PROCEDURE — 85025 COMPLETE CBC W/AUTO DIFF WBC: CPT

## 2021-07-12 PROCEDURE — 85652 RBC SED RATE AUTOMATED: CPT

## 2021-07-12 PROCEDURE — 99218 HC RM OBSERVATION: CPT

## 2021-07-12 PROCEDURE — 74011250636 HC RX REV CODE- 250/636: Performed by: INTERNAL MEDICINE

## 2021-07-12 PROCEDURE — 73610 X-RAY EXAM OF ANKLE: CPT

## 2021-07-12 PROCEDURE — 86141 C-REACTIVE PROTEIN HS: CPT

## 2021-07-12 PROCEDURE — 74011000258 HC RX REV CODE- 258: Performed by: INTERNAL MEDICINE

## 2021-07-12 PROCEDURE — 96374 THER/PROPH/DIAG INJ IV PUSH: CPT

## 2021-07-12 RX ORDER — ENOXAPARIN SODIUM 100 MG/ML
40 INJECTION SUBCUTANEOUS DAILY
Status: DISCONTINUED | OUTPATIENT
Start: 2021-07-13 | End: 2021-07-16 | Stop reason: HOSPADM

## 2021-07-12 RX ORDER — ACETAMINOPHEN 650 MG/1
650 SUPPOSITORY RECTAL
Status: DISCONTINUED | OUTPATIENT
Start: 2021-07-12 | End: 2021-07-16 | Stop reason: HOSPADM

## 2021-07-12 RX ORDER — POLYETHYLENE GLYCOL 3350 17 G/17G
17 POWDER, FOR SOLUTION ORAL DAILY PRN
Status: DISCONTINUED | OUTPATIENT
Start: 2021-07-12 | End: 2021-07-16 | Stop reason: HOSPADM

## 2021-07-12 RX ORDER — SODIUM CHLORIDE 0.9 % (FLUSH) 0.9 %
5-40 SYRINGE (ML) INJECTION AS NEEDED
Status: DISCONTINUED | OUTPATIENT
Start: 2021-07-12 | End: 2021-07-16 | Stop reason: HOSPADM

## 2021-07-12 RX ORDER — ONDANSETRON 2 MG/ML
4 INJECTION INTRAMUSCULAR; INTRAVENOUS
Status: DISCONTINUED | OUTPATIENT
Start: 2021-07-12 | End: 2021-07-16 | Stop reason: HOSPADM

## 2021-07-12 RX ORDER — ACETAMINOPHEN 325 MG/1
650 TABLET ORAL
Status: DISCONTINUED | OUTPATIENT
Start: 2021-07-12 | End: 2021-07-16 | Stop reason: HOSPADM

## 2021-07-12 RX ORDER — ONDANSETRON 8 MG/1
4 TABLET, ORALLY DISINTEGRATING ORAL
Status: DISCONTINUED | OUTPATIENT
Start: 2021-07-12 | End: 2021-07-16 | Stop reason: HOSPADM

## 2021-07-12 RX ORDER — SODIUM CHLORIDE 0.9 % (FLUSH) 0.9 %
5-40 SYRINGE (ML) INJECTION EVERY 8 HOURS
Status: DISCONTINUED | OUTPATIENT
Start: 2021-07-12 | End: 2021-07-16 | Stop reason: HOSPADM

## 2021-07-12 RX ORDER — VANCOMYCIN HYDROCHLORIDE
1250 EVERY 12 HOURS
Status: DISCONTINUED | OUTPATIENT
Start: 2021-07-13 | End: 2021-07-15

## 2021-07-12 RX ORDER — VANCOMYCIN 2 GRAM/500 ML IN 0.9 % SODIUM CHLORIDE INTRAVENOUS
2000 ONCE
Status: COMPLETED | OUTPATIENT
Start: 2021-07-12 | End: 2021-07-12

## 2021-07-12 RX ADMIN — VANCOMYCIN HYDROCHLORIDE 2000 MG: 10 INJECTION, POWDER, LYOPHILIZED, FOR SOLUTION INTRAVENOUS at 18:04

## 2021-07-12 RX ADMIN — PIPERACILLIN AND TAZOBACTAM 4.5 G: 4; .5 INJECTION, POWDER, FOR SOLUTION INTRAVENOUS at 16:51

## 2021-07-12 RX ADMIN — Medication 10 ML: at 17:47

## 2021-07-12 NOTE — PROGRESS NOTES
Pharmacokinetic Consult to Pharmacist    Jaime Tarango is a 74 y.o. male being treated with vancomycin. Height: 6' 1\" (185.4 cm)  Weight: 81.6 kg (180 lb)  Lab Results   Component Value Date/Time    BUN 7 (L) 07/12/2021 04:23 PM    Creatinine 0.78 (L) 07/12/2021 04:23 PM    WBC 8.9 07/12/2021 04:23 PM    Procalcitonin <0.05 07/12/2021 04:23 PM    Lactic acid 1.5 04/26/2020 04:07 PM      Estimated Creatinine Clearance: 102.4 mL/min (A) (based on SCr of 0.78 mg/dL (L)). Day 1 of vancomycin. Goal trough is 15-20. Vancomycin dose initiated at 2000 mg IV x 1 followed by 1250 mg IV q12h based on prior dosing history. Levels will be ordered as clinically indicated. Pharmacy will continue to follow. Please call with any questions.     Thank you,  Serena Medrano, PharmD, BCPS  Clinical Pharmacist  [phone]

## 2021-07-12 NOTE — PROGRESS NOTES
07/12/21 1300   Dual Skin Pressure Injury Assessment   Dual Skin Pressure Injury Assessment X   Second Care Provider (Based on [address] [place] [address]) Ena RN   Foot Left   Skin Integumentary   Skin Integumentary (WDL) X    Pressure  Injury Documentation Pressure Injury Noted-See Wound LDA to Document   Skin Color Appropriate for ethnicity; Red  (L ankle)   Skin Condition/Temp Warm;Dry;Flaky   Skin Integrity Wound (add Wound LDA)  (L ankle wound)   Wound Prevention and Protection Methods   Orientation of Wound Prevention Posterior   Location of Wound Prevention Ankle; Sacrum/Coccyx   Dressing Present  Yes  (L ankle)   Dressing Status Intact   Wound Offloading (Prevention Methods) Bed, pressure reduction mattress;Pillows;Repositioning; Foam silicone     Left ankle open wound from previous hardware removal. No other skin issues noted.

## 2021-07-12 NOTE — H&P
[facility] Hospitalist History and Physical       Name:  Asad Ingram  Age:66 y.o. Sex:male   :  1955    MRN:  [id number]   PCP:  Akiko Ko MD      Admit Date:  2021 12:41 PM   Chief Complaint: left ankle cellulitis    Reason for Admission:   Osteomyelitis [facility]) [M86.9]    Assessment & Plan:     # L Ankle cellulitis  Highly suspect chronic osteomyelitis  - IV antbx vanco/zosyn  - blood and wound culture  - xray eval for osteo  - inflammatory markers  - ?if any hardware remaining - will consult to ortho (seen by them at last year's admission)  - no evidence of Sepsis. # hx of HTN  - controlled currently, monitor    # tob abuse  - counseled. Disposition/Expected LOS: 1-2 days pending results  Diet: DIET ADULT  VTE ppx: lovenox  Code status: Full Code      History of Presenting Illness:     Asad Ingram is a 74 y.o. male with medical history of hypertension who presented as direct admission from Dr. Aguilera Gravely. Patient with history of left ankle fracture status post repair with hardware and admitted in May 2020 for left ankle infection. Patient had hardware removed and was discharged on IV antibiotics on 2020. Patient was then noncompliant with follow-up for treatment and readmitted for continued infection later same month. Patient was taken to the OR for debridement there is subsequently left AMA shortly after surgery. It is unclear if patient continued antibiotics after discharge but doubt so. He presented to his PCP office today with report of 24 to 36 hours of worsening left ankle joint stiffness, erythema of left ankle up to mid shin, open area with purulent discharge on lateral left malleolus. It was requested patient admit for IV antibiotics and work-up for osteomyelitis as well as consultation by orthopedics. Patient denies any fevers, chills, nausea, vomiting, headaches or other generalized complaints.   He spends most of his interview reporting that nothing has gone \"right at this hospital\". 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 He has reported to his PCP that he is homeless but tells me he has a place to stay that is \"working out\". Review of Systems:  A 14 point review of systems was taken and pertinent positive as per HPI. Past Medical History:   Diagnosis Date    Stroke [facility])        Past Surgical History:   Procedure Laterality Date    HX ORTHOPAEDIC      Left ankle fx with pinning       Family History : reviewed  No family history on file. Social History     Tobacco Use    Smoking status: Current Every Day Smoker     Packs/day: 0.50    Smokeless tobacco: Never Used   Substance Use Topics    Alcohol use: Yes     Comment: occasional       No Known Allergies    Immunization History   Administered Date(s) Administered    TB Skin Test (PPD) Intradermal 04/30/2020, 05/21/2020         PTA Medications:  No current outpatient medications    Objective:     Patient Vitals for the past 24 hrs:   Temp Pulse Resp BP SpO2   07/12/21 1523 98.2 °F (36.8 °C) 67 16 137/81 100 %   07/12/21 1309 98.2 °F (36.8 °C) 70 16 (!) 141/88 97 %       Oxygen Therapy  O2 Sat (%): 100 % (07/12/21 1523)    Body mass index is 23.75 kg/m².     Physical Exam:    General:  No acute distress, speaking in full sentences  HEENT:  Pupils equal and reactive to light and accommodation, oropharynx is clear   Neck:   Supple, no lymphadenopathy, no JVD   Lungs:  Clear to auscultation bilaterally   CV:   Regular rate and rhythm with normal S1 and S2   Abdomen:  Soft, nontender, nondistended, normoactive bowel sounds   Extremities:  Left ankle with open purulent lesion approx 2.5cm on lateral malleolus, erythema to just distal from knee  Neuro:  Nonfocal, A&O x3   Psych:  Normal mood and affect       Data Reviewed: I have reviewed all labs, meds, and studies. Recent Results (from the past 24 hour(s))   CBC WITH AUTOMATED DIFF    Collection Time: 07/12/21  4:23 PM   Result Value Ref Range    WBC 8.9 4.3 - 11.1 K/uL    RBC 4.03 (L) 4.23 - 5.6 M/uL    HGB 11.3 (L) 13.6 - 17.2 g/dL    HCT 35.3 (L) 41.1 - 50.3 %    MCV 87.6 79.6 - 97.8 FL    MCH 28.0 26.1 - 32.9 PG    MCHC 32.0 31.4 - 35.0 g/dL    RDW 13.8 11.9 - 14.6 %    PLATELET 949 740 - 377 K/uL    MPV 10.7 9.4 - 12.3 FL    ABSOLUTE NRBC 0.00 0.0 - 0.2 K/uL    DF AUTOMATED      NEUTROPHILS 70 43 - 78 %    LYMPHOCYTES 21 13 - 44 %    MONOCYTES 9 4.0 - 12.0 %    EOSINOPHILS 0 (L) 0.5 - 7.8 %    BASOPHILS 0 0.0 - 2.0 %    IMMATURE GRANULOCYTES 0 0.0 - 5.0 %    ABS. NEUTROPHILS 6.2 1.7 - 8.2 K/UL    ABS. LYMPHOCYTES 1.9 0.5 - 4.6 K/UL    ABS. MONOCYTES 0.8 0.1 - 1.3 K/UL    ABS. EOSINOPHILS 0.0 0.0 - 0.8 K/UL    ABS. BASOPHILS 0.0 0.0 - 0.2 K/UL    ABS. IMM.  GRANS. 0.0 0.0 - 0.5 K/UL   METABOLIC PANEL, BASIC    Collection Time: 07/12/21  4:23 PM   Result Value Ref Range    Sodium 140 136 - 145 mmol/L    Potassium 4.4 3.5 - 5.1 mmol/L    Chloride 109 (H) 98 - 107 mmol/L    CO2 28 21 - 32 mmol/L    Anion gap 3 (L) 7 - 16 mmol/L    Glucose 87 65 - 100 mg/dL    BUN 7 (L) 8 - 23 MG/DL    Creatinine 0.78 (L) 0.8 - 1.5 MG/DL    GFR est AA >60 >60 ml/min/1.73m2    GFR est non-AA >60 >60 ml/min/1.73m2    Calcium 9.0 8.3 - 10.4 MG/DL   CRP, HIGH SENSITIVITY    Collection Time: 07/12/21  4:23 PM   Result Value Ref Range    CRP, High sensitivity 53.3 mg/L   PROCALCITONIN    Collection Time: 07/12/21  4:23 PM   Result Value Ref Range    Procalcitonin <0.05 ng/mL       EKG Results     None          All Micro Results     Procedure Component Value Units Date/Time    CULTURE, BLOOD [[id number]] Collected: 07/12/21 1624    Order Status: Completed Specimen: Blood Updated: 07/12/21 1648    CULTURE, BLOOD [[id number]] Collected: 07/12/21 1624    Order Status: Completed Specimen: Blood Updated: 07/12/21 1648          Other Studies:  No results found.       Medications:  Medications Administered      Medications Administered     piperacillin-tazobactam (ZOSYN) 4.5 g in 0.9% sodium chloride (MBP/ADV) 100 mL MBP     Admin Date  07/12/2021 Action  New Bag Dose  4.5 g Rate  25 mL/hr Route  IntraVENous Administered By  Leo Farias                      Problem List:     Hospital Problems as of 7/12/2021 Never Reviewed        Codes Class Noted - Resolved POA    [facility] ICD-10-CM: M86.9  ICD-9-CM: 730.20  4/30/2020 - Present Unknown                 Signed By: DO Emeli Calle Hospitalist Service    July 12, 2021  4:22 PM

## 2021-07-13 ENCOUNTER — ANESTHESIA EVENT (OUTPATIENT)
Dept: SURGERY | Age: 66
DRG: 982 | End: 2021-07-13
Payer: MEDICARE

## 2021-07-13 PROBLEM — E78.5 HYPERLIPIDEMIA: Status: ACTIVE | Noted: 2021-07-13

## 2021-07-13 PROBLEM — I10 HTN (HYPERTENSION): Status: ACTIVE | Noted: 2021-07-13

## 2021-07-13 PROBLEM — Z86.73 H/O: CVA (CEREBROVASCULAR ACCIDENT): Status: ACTIVE | Noted: 2021-07-13

## 2021-07-13 PROBLEM — I65.21 RIGHT INTERNAL CAROTID OCCLUSION: Status: ACTIVE | Noted: 2021-07-13

## 2021-07-13 LAB
ALBUMIN SERPL-MCNC: 3.2 G/DL (ref 3.2–4.6)
ALBUMIN/GLOB SERPL: 0.7 {RATIO} (ref 1.2–3.5)
ALP SERPL-CCNC: 75 U/L (ref 50–136)
ALT SERPL-CCNC: 15 U/L (ref 12–65)
ANION GAP SERPL CALC-SCNC: 2 MMOL/L (ref 7–16)
AST SERPL-CCNC: 9 U/L (ref 15–37)
BASOPHILS # BLD: 0 K/UL (ref 0–0.2)
BASOPHILS NFR BLD: 0 % (ref 0–2)
BILIRUB SERPL-MCNC: 0.6 MG/DL (ref 0.2–1.1)
BUN SERPL-MCNC: 10 MG/DL (ref 8–23)
CALCIUM SERPL-MCNC: 9.3 MG/DL (ref 8.3–10.4)
CHLORIDE SERPL-SCNC: 109 MMOL/L (ref 98–107)
CO2 SERPL-SCNC: 31 MMOL/L (ref 21–32)
CREAT SERPL-MCNC: 0.9 MG/DL (ref 0.8–1.5)
DIFFERENTIAL METHOD BLD: ABNORMAL
EOSINOPHIL # BLD: 0 K/UL (ref 0–0.8)
EOSINOPHIL NFR BLD: 0 % (ref 0.5–7.8)
ERYTHROCYTE [DISTWIDTH] IN BLOOD BY AUTOMATED COUNT: 13.6 % (ref 11.9–14.6)
GLOBULIN SER CALC-MCNC: 4.4 G/DL (ref 2.3–3.5)
GLUCOSE SERPL-MCNC: 91 MG/DL (ref 65–100)
HCT VFR BLD AUTO: 38.7 % (ref 41.1–50.3)
HGB BLD-MCNC: 12 G/DL (ref 13.6–17.2)
IMM GRANULOCYTES # BLD AUTO: 0 K/UL (ref 0–0.5)
IMM GRANULOCYTES NFR BLD AUTO: 0 % (ref 0–5)
LYMPHOCYTES # BLD: 0.8 K/UL (ref 0.5–4.6)
LYMPHOCYTES NFR BLD: 11 % (ref 13–44)
MCH RBC QN AUTO: 27.3 PG (ref 26.1–32.9)
MCHC RBC AUTO-ENTMCNC: 31 G/DL (ref 31.4–35)
MCV RBC AUTO: 88 FL (ref 79.6–97.8)
MONOCYTES # BLD: 0.6 K/UL (ref 0.1–1.3)
MONOCYTES NFR BLD: 8 % (ref 4–12)
NEUTS SEG # BLD: 5.6 K/UL (ref 1.7–8.2)
NEUTS SEG NFR BLD: 80 % (ref 43–78)
NRBC # BLD: 0 K/UL (ref 0–0.2)
PLATELET # BLD AUTO: 214 K/UL (ref 150–450)
PMV BLD AUTO: 10.6 FL (ref 9.4–12.3)
POTASSIUM SERPL-SCNC: 4 MMOL/L (ref 3.5–5.1)
PROT SERPL-MCNC: 7.6 G/DL (ref 6.3–8.2)
RBC # BLD AUTO: 4.4 M/UL (ref 4.23–5.6)
SODIUM SERPL-SCNC: 142 MMOL/L (ref 136–145)
WBC # BLD AUTO: 6.9 K/UL (ref 4.3–11.1)

## 2021-07-13 PROCEDURE — 96372 THER/PROPH/DIAG INJ SC/IM: CPT

## 2021-07-13 PROCEDURE — 74011250636 HC RX REV CODE- 250/636: Performed by: INTERNAL MEDICINE

## 2021-07-13 PROCEDURE — 96376 TX/PRO/DX INJ SAME DRUG ADON: CPT

## 2021-07-13 PROCEDURE — 85025 COMPLETE CBC W/AUTO DIFF WBC: CPT

## 2021-07-13 PROCEDURE — 74011250636 HC RX REV CODE- 250/636: Performed by: FAMILY MEDICINE

## 2021-07-13 PROCEDURE — 74011250637 HC RX REV CODE- 250/637: Performed by: FAMILY MEDICINE

## 2021-07-13 PROCEDURE — 87186 SC STD MICRODIL/AGAR DIL: CPT

## 2021-07-13 PROCEDURE — 87070 CULTURE OTHR SPECIMN AEROBIC: CPT

## 2021-07-13 PROCEDURE — 87077 CULTURE AEROBIC IDENTIFY: CPT

## 2021-07-13 PROCEDURE — 99218 HC RM OBSERVATION: CPT

## 2021-07-13 PROCEDURE — 80053 COMPREHEN METABOLIC PANEL: CPT

## 2021-07-13 PROCEDURE — 96375 TX/PRO/DX INJ NEW DRUG ADDON: CPT

## 2021-07-13 PROCEDURE — 74011000258 HC RX REV CODE- 258: Performed by: INTERNAL MEDICINE

## 2021-07-13 PROCEDURE — 36415 COLL VENOUS BLD VENIPUNCTURE: CPT

## 2021-07-13 PROCEDURE — 74011250637 HC RX REV CODE- 250/637: Performed by: INTERNAL MEDICINE

## 2021-07-13 RX ORDER — SODIUM CHLORIDE 9 MG/ML
75 INJECTION, SOLUTION INTRAVENOUS CONTINUOUS
Status: DISCONTINUED | OUTPATIENT
Start: 2021-07-13 | End: 2021-07-16 | Stop reason: HOSPADM

## 2021-07-13 RX ORDER — ATORVASTATIN CALCIUM 40 MG/1
40 TABLET, FILM COATED ORAL
Status: DISCONTINUED | OUTPATIENT
Start: 2021-07-13 | End: 2021-07-16 | Stop reason: HOSPADM

## 2021-07-13 RX ORDER — ASPIRIN 81 MG/1
81 TABLET ORAL DAILY
Status: DISCONTINUED | OUTPATIENT
Start: 2021-07-13 | End: 2021-07-16 | Stop reason: HOSPADM

## 2021-07-13 RX ORDER — LISINOPRIL 5 MG/1
10 TABLET ORAL DAILY
Status: DISCONTINUED | OUTPATIENT
Start: 2021-07-13 | End: 2021-07-16 | Stop reason: HOSPADM

## 2021-07-13 RX ADMIN — ENOXAPARIN SODIUM 40 MG: 40 INJECTION SUBCUTANEOUS at 08:13

## 2021-07-13 RX ADMIN — LISINOPRIL 10 MG: 5 TABLET ORAL at 22:25

## 2021-07-13 RX ADMIN — ATORVASTATIN CALCIUM 40 MG: 40 TABLET, FILM COATED ORAL at 22:25

## 2021-07-13 RX ADMIN — PIPERACILLIN AND TAZOBACTAM 4.5 G: 4; .5 INJECTION, POWDER, FOR SOLUTION INTRAVENOUS at 16:52

## 2021-07-13 RX ADMIN — VANCOMYCIN HYDROCHLORIDE 1250 MG: 10 INJECTION, POWDER, LYOPHILIZED, FOR SOLUTION INTRAVENOUS at 16:52

## 2021-07-13 RX ADMIN — PIPERACILLIN AND TAZOBACTAM 4.5 G: 4; .5 INJECTION, POWDER, FOR SOLUTION INTRAVENOUS at 23:13

## 2021-07-13 RX ADMIN — Medication 10 ML: at 14:15

## 2021-07-13 RX ADMIN — Medication 1 AMPULE: at 00:58

## 2021-07-13 RX ADMIN — Medication 1 AMPULE: at 21:14

## 2021-07-13 RX ADMIN — PIPERACILLIN AND TAZOBACTAM 4.5 G: 4; .5 INJECTION, POWDER, FOR SOLUTION INTRAVENOUS at 00:58

## 2021-07-13 RX ADMIN — ASPIRIN 81 MG: 81 TABLET ORAL at 22:25

## 2021-07-13 RX ADMIN — VANCOMYCIN HYDROCHLORIDE 1250 MG: 10 INJECTION, POWDER, LYOPHILIZED, FOR SOLUTION INTRAVENOUS at 06:14

## 2021-07-13 RX ADMIN — PIPERACILLIN AND TAZOBACTAM 4.5 G: 4; .5 INJECTION, POWDER, FOR SOLUTION INTRAVENOUS at 08:11

## 2021-07-13 RX ADMIN — Medication 1 AMPULE: at 08:13

## 2021-07-13 RX ADMIN — SODIUM CHLORIDE 75 ML/HR: 900 INJECTION, SOLUTION INTRAVENOUS at 22:25

## 2021-07-13 RX ADMIN — Medication 10 ML: at 21:24

## 2021-07-13 NOTE — ANESTHESIA PREPROCEDURE EVALUATION
Relevant Problems   No relevant active problems       Anesthetic History   No history of anesthetic complications            Review of Systems / Medical History  Patient summary reviewed and pertinent labs reviewed    Pulmonary          Smoker         Neuro/Psych       CVA (2018): no residual symptoms       Cardiovascular    Hypertension (non-compliant with med): poorly controlled              Exercise tolerance: >4 METS     GI/Hepatic/Renal  Within defined limits              Endo/Other  Within defined limits           Other Findings              Physical Exam    Airway  Mallampati: II  TM Distance: > 6 cm  Neck ROM: normal range of motion   Mouth opening: Normal     Cardiovascular    Rhythm: regular  Rate: normal         Dental  No notable dental hx       Pulmonary  Breath sounds clear to auscultation               Abdominal         Other Findings            Anesthetic Plan    ASA: 3  Anesthesia type: general            Anesthetic plan and risks discussed with: Patient

## 2021-07-13 NOTE — INTERDISCIPLINARY ROUNDS
Interdisciplinary Rounds completed with Nursing, Case Management, Dietician, Pharmacy,  Physician and PT/OT present. Plan of care reviewed and updated.     Consults include Ortho

## 2021-07-13 NOTE — PROGRESS NOTES
Hourly rounds performed through shift, pt denies needs at this time. Patient will be NPO at midnight for surgery tomorrow. Bed in low position, locked and call light/personal items within reach. Will continue to monitor and report to night shift nurse.

## 2021-07-13 NOTE — PROGRESS NOTES
Hospitalist Progress Note     Name:  Ronn Costa  Age:66 y.o. Sex:male   :  1955    MRN:  [id number]   Admit Date:  2021  Presenting Complaint: No chief complaint on file. Initial Admission Diagnosis: Osteomyelitis [facility]) [M86.9]     Hospital Course/Interval history:     Ronn Costa is a 74 y.o. male with medical history of hypertension who presented as direct admission from Dr. Rosmery Shannon. Pt says he also h/o cva. Says not on any medications at home. Based on pcp notes- h/o rt carotid artery occlusion[2016],htn,hyperlipidemia and cva [2016]  Pt admitted for left ankle cellulitis to r/o osteomyelitis. Previous h/o leaving AMA after left ankle debridement and not completing the antibiotics. Subjective (21): Says doing ok  Mild left ankle pain  Xray negative for osteomyelitis  Later today orthopedic evaluated and decision for debridement in am.      Review of Systems: 14 point review of systems is otherwise negative with the exception of the elements mentioned above. Assessment & Plan   # L Ankle cellulitis  Highly suspect chronic osteomyelitis- xray negative for osteo  Orthopedic will take pt for debridement in am  - IV antbx vanco/zosyn       # hx of HTN/hyperlipedemia/cva- not on any medications at home  -will add asa 81 mg, order lipid panel, add lisinopril for htn and lipitor for hyperlipidemia  - controlled currently, monitor     # tobacco abuse  - counseled.        Diet:  ADULT DIET Regular  DIET NPO  DVT PPx: lovenox- held for debridement in am  Code status: Full Code    Objective:     Patient Vitals for the past 24 hrs:   Temp Pulse Resp BP SpO2   21 97.9 °F (36.6 °C) 73 18 (!) 140/92 98 %   21 1602 98.4 °F (36.9 °C) 84 17 (!) 147/97 99 %   21 1112 98.2 °F (36.8 °C) 77 17 (!) 142/89 99 %   21 0637 98.7 °F (37.1 °C) 71 18 (!) 143/98 99 %   21 0511 98.3 °F (36.8 °C) 79 17 (!) 182/86 97 %   21 0047 97.8 °F (36.6 °C) (!) 59 19 (!) 144/80 98 %     Oxygen Therapy  O2 Sat (%): 98 % (07/13/21 2008)  O2 Device: None (Room air) (07/13/21 0511)    Body mass index is 23.75 kg/m². Physical Exam:   General:  No acute distress, speaking in full sentences, no use of accessory muscles   HEENT- pupils equal reacting to light, neck supple, throat normal  Lungs:  Clear to auscultation bilaterally   CV:  Regular rate and rhythm with normal S1 and S2   Abdomen:  Soft, nontender, nondistended, normoactive bowel sounds   Extremities:  No cyanosis clubbing or edema   Neuro:  Nonfocal, A&O x3   Psych:  Normal affect   Skin- left ankle wound- lateral aspect,malleolar region, dressing in place, mild tenderness on palpation    Data Review:  I have reviewed all labs, meds, and studies from the last 24 hours:    Labs:    Recent Results (from the past 24 hour(s))   METABOLIC PANEL, COMPREHENSIVE    Collection Time: 07/13/21  6:02 AM   Result Value Ref Range    Sodium 142 136 - 145 mmol/L    Potassium 4.0 3.5 - 5.1 mmol/L    Chloride 109 (H) 98 - 107 mmol/L    CO2 31 21 - 32 mmol/L    Anion gap 2 (L) 7 - 16 mmol/L    Glucose 91 65 - 100 mg/dL    BUN 10 8 - 23 MG/DL    Creatinine 0.90 0.8 - 1.5 MG/DL    GFR est AA >60 >60 ml/min/1.73m2    GFR est non-AA >60 >60 ml/min/1.73m2    Calcium 9.3 8.3 - 10.4 MG/DL    Bilirubin, total 0.6 0.2 - 1.1 MG/DL    ALT (SGPT) 15 12 - 65 U/L    AST (SGOT) 9 (L) 15 - 37 U/L    Alk.  phosphatase 75 50 - 136 U/L    Protein, total 7.6 6.3 - 8.2 g/dL    Albumin 3.2 3.2 - 4.6 g/dL    Globulin 4.4 (H) 2.3 - 3.5 g/dL    A-G Ratio 0.7 (L) 1.2 - 3.5     CBC WITH AUTOMATED DIFF    Collection Time: 07/13/21  6:02 AM   Result Value Ref Range    WBC 6.9 4.3 - 11.1 K/uL    RBC 4.40 4.23 - 5.6 M/uL    HGB 12.0 (L) 13.6 - 17.2 g/dL    HCT 38.7 (L) 41.1 - 50.3 %    MCV 88.0 79.6 - 97.8 FL    MCH 27.3 26.1 - 32.9 PG    MCHC 31.0 (L) 31.4 - 35.0 g/dL    RDW 13.6 11.9 - 14.6 %    PLATELET 305 174 - 068 K/uL    MPV 10.6 9.4 - 12.3 FL    ABSOLUTE NRBC 0.00 0.0 - 0.2 K/uL    DF AUTOMATED      NEUTROPHILS 80 (H) 43 - 78 %    LYMPHOCYTES 11 (L) 13 - 44 %    MONOCYTES 8 4.0 - 12.0 %    EOSINOPHILS 0 (L) 0.5 - 7.8 %    BASOPHILS 0 0.0 - 2.0 %    IMMATURE GRANULOCYTES 0 0.0 - 5.0 %    ABS. NEUTROPHILS 5.6 1.7 - 8.2 K/UL    ABS. LYMPHOCYTES 0.8 0.5 - 4.6 K/UL    ABS. MONOCYTES 0.6 0.1 - 1.3 K/UL    ABS. EOSINOPHILS 0.0 0.0 - 0.8 K/UL    ABS. BASOPHILS 0.0 0.0 - 0.2 K/UL    ABS. IMM. GRANS. 0.0 0.0 - 0.5 K/UL   CULTURE, WOUND W GRAM STAIN    Collection Time: 07/13/21  6:10 AM    Specimen: Ankle; Wound    LEFT   Result Value Ref Range    Special Requests: NO SPECIAL REQUESTS      GRAM STAIN 0 TO 12 WBCS OIF     GRAM STAIN FEW GRAM POSITIVE COCCI      Culture result:        NO GROWTH AFTER SHORT PERIOD OF INCUBATION. FURTHER RESULTS TO FOLLOW AFTER OVERNIGHT INCUBATION. All Micro Results     Procedure Component Value Units Date/Time    CULTURE, Rock Bodily STAIN [[id number]] Collected: 07/13/21 0610    Order Status: Completed Specimen: Wound from Ankle Updated: 07/13/21 1314     Special Requests: NO SPECIAL REQUESTS        GRAM STAIN 0 TO 12 WBCS OIF      FEW GRAM POSITIVE COCCI        Culture result:       NO GROWTH AFTER SHORT PERIOD OF INCUBATION. FURTHER RESULTS TO FOLLOW AFTER OVERNIGHT INCUBATION. CULTURE, BLOOD [[id number]] Collected: 07/12/21 1624    Order Status: Completed Specimen: Blood Updated: 07/13/21 0651     Special Requests: --        LEFT  Antecubital       Culture result: NO GROWTH AFTER 14 HOURS       CULTURE, BLOOD [[id number]] Collected: 07/12/21 1624    Order Status: Completed Specimen: Blood Updated: 07/13/21 0651     Special Requests: --        RIGHT  Antecubital       Culture result: NO GROWTH AFTER 14 HOURS             EKG Results     None          Other Studies:  No results found.     Current Meds:   Current Facility-Administered Medications   Medication Dose Route Frequency    [START ON 7/14/2021] VANCOMYCIN TROUGH REMINDER Other ONCE    alcohol 62% (NOZIN) nasal  1 Ampule  1 Ampule Topical Q12H    sodium chloride (NS) flush 5-40 mL  5-40 mL IntraVENous Q8H    sodium chloride (NS) flush 5-40 mL  5-40 mL IntraVENous PRN    acetaminophen (TYLENOL) tablet 650 mg  650 mg Oral Q6H PRN    Or    acetaminophen (TYLENOL) suppository 650 mg  650 mg Rectal Q6H PRN    polyethylene glycol (MIRALAX) packet 17 g  17 g Oral DAILY PRN    ondansetron (ZOFRAN ODT) tablet 4 mg  4 mg Oral Q8H PRN    Or    ondansetron (ZOFRAN) injection 4 mg  4 mg IntraVENous Q6H PRN    enoxaparin (LOVENOX) injection 40 mg  40 mg SubCUTAneous DAILY    piperacillin-tazobactam (ZOSYN) 4.5 g in 0.9% sodium chloride (MBP/ADV) 100 mL MBP  4.5 g IntraVENous Q8H    vancomycin (VANCOCIN) 1250 mg in  ml infusion  1,250 mg IntraVENous Q12H       Problem List:  Hospital Problems as of 7/13/2021 Never Reviewed        Codes Class Noted - Resolved POA    HTN (hypertension) ICD-10-CM: I10  ICD-9-CM: 401.9  7/13/2021 - Present Unknown        Hyperlipidemia ICD-10-CM: E78.5  ICD-9-CM: 272.4  7/13/2021 - Present Unknown        H/O: CVA (cerebrovascular accident) ICD-10-CM: Z86.73  ICD-9-CM: V12.54  7/13/2021 - Present Unknown        Right internal carotid occlusion ICD-10-CM: I65.21  ICD-9-CM: 433.10  7/13/2021 - Present Unknown        Cellulitis ICD-10-CM: L03.90  ICD-9-CM: 682.9  7/12/2021 - Present Unknown        Tobacco abuse ICD-10-CM: Z72.0  ICD-9-CM: 305.1  7/12/2021 - Present Unknown        Non-compliant patient ICD-10-CM: Z91.19  ICD-9-CM: V15.81  5/22/2020 - Present Yes        Osteomyelitis ([address] ICD-10-CM: M86.9  ICD-9-CM: 730.20  4/30/2020 - Present Unknown                   Signed By: Yris Lima MD   [facility] Hospitalist Service    July 13, 2021

## 2021-07-13 NOTE — CONSULTS
Progress Note    Patient: Kimberley Latham MRN: [id number]  SSN: [id number]    YOB: 1955  Age: 74 y.o. Sex: male      Admit Date: 7/12/2021    LOS: 0 days     Subjective:     Patient is a 25-year-old male who was seen in April and May 2020. Apparently he had open reduction internal fixation of a left ankle fracture at an outside institution and then showed up here in April 2020. That led to removal of hardware and debridement of his left ankle wound. He then sort of lost to follow-up in her. Again in May 2020 had repeat debridement. He does not like he got his entire course of IV antibiotics. He says he is done pretty well until recently whenever he noticed some stiffness and tightness in his left ankle and then it began to spontaneously drain. Objective:     Vitals:    07/13/21 0047 07/13/21 0511 07/13/21 0637 07/13/21 1112   BP: (!) 144/80 (!) 182/86 (!) 143/98 (!) 142/89   Pulse: (!) 59 79 71 77   Resp: 19 17 18 17   Temp: 97.8 °F (36.6 °C) 98.3 °F (36.8 °C) 98.7 °F (37.1 °C) 98.2 °F (36.8 °C)   SpO2: 98% 97% 99% 99%   Weight:       Height:            Intake and Output:  Current Shift: No intake/output data recorded. Last three shifts: 07/11 1901 - 07/13 0700  In: 850 [I.V.:850]  Out: 850 [Urine:850]    alert and oriented to person place time and situation  Skin -he does have an area over the lateral aspect of his left ankle that appears to be a sinus tract that is draining bloody purulent drainage  Motor and sensory function intact in LEFT LOWER extremity  Pulses palpable in LEFT LOWER extremity       Lab/Data Review:  CBC:   Lab Results   Component Value Date/Time    WBC 6.9 07/13/2021 06:02 AM    HGB 12.0 (L) 07/13/2021 06:02 AM    HCT 38.7 (L) 07/13/2021 06:02 AM     07/13/2021 06:02 AM          Assessment:     Draining left ankle wound    Plan:     Obviously I do have some concerns about his ability to follow-up and allow this to actually get better for the long term.   I think it is reasonable to try to treat this with a repeat debridement as he is willing to do this. My concern is the fact that he might be a poor candidate to get long-term IV antibiotics so that this can resolve. The plan will be to debride his left ankle wound tomorrow in the operating room and send bone for cultures at that time.   Hopefully we can close his wound but we may need to place a wound VAC over this    Signed By: Lisa Sifuentes MD     July 13, 2021

## 2021-07-13 NOTE — PROGRESS NOTES
CM spoke with patient at bedside for discharge planning needs. Demographics verified and updated. Patient states he has been living with a friend. He states he is independent with ADLs, ambulates without assistance and drives. He has a cane and a walker. Debridement planned tomorrow. Patient is insured and denies difficulties obtaining medications; does not have a preferred pharmacy. Patient states he has been seeing PCP at [facility] in [place]. CM will follow to assist with discharge needs that may arise. Care Management Interventions  PCP Verified by CM: Yes [facility])  Mode of Transport at Discharge: Self  Discharge Durable Medical Equipment: No  Physical Therapy Consult: No  Occupational Therapy Consult: No  Current Support Network:  Other (friends home)  Discharge Location  Discharge Placement: Home

## 2021-07-14 ENCOUNTER — ANESTHESIA (OUTPATIENT)
Dept: SURGERY | Age: 66
DRG: 982 | End: 2021-07-14
Payer: MEDICARE

## 2021-07-14 PROBLEM — L03.116 CELLULITIS OF FOOT, LEFT: Status: ACTIVE | Noted: 2021-07-12

## 2021-07-14 LAB
ANION GAP SERPL CALC-SCNC: 5 MMOL/L (ref 7–16)
BASOPHILS # BLD: 0 K/UL (ref 0–0.2)
BASOPHILS NFR BLD: 0 % (ref 0–2)
BUN SERPL-MCNC: 13 MG/DL (ref 8–23)
CALCIUM SERPL-MCNC: 8.9 MG/DL (ref 8.3–10.4)
CHLORIDE SERPL-SCNC: 110 MMOL/L (ref 98–107)
CHOLEST SERPL-MCNC: 147 MG/DL
CO2 SERPL-SCNC: 28 MMOL/L (ref 21–32)
CREAT SERPL-MCNC: 1.02 MG/DL (ref 0.8–1.5)
DIFFERENTIAL METHOD BLD: ABNORMAL
EOSINOPHIL # BLD: 0 K/UL (ref 0–0.8)
EOSINOPHIL NFR BLD: 1 % (ref 0.5–7.8)
ERYTHROCYTE [DISTWIDTH] IN BLOOD BY AUTOMATED COUNT: 13.4 % (ref 11.9–14.6)
GLUCOSE SERPL-MCNC: 98 MG/DL (ref 65–100)
HCT VFR BLD AUTO: 34.8 % (ref 41.1–50.3)
HDLC SERPL-MCNC: 43 MG/DL (ref 40–60)
HDLC SERPL: 3.4 {RATIO}
HGB BLD-MCNC: 11 G/DL (ref 13.6–17.2)
IMM GRANULOCYTES # BLD AUTO: 0 K/UL (ref 0–0.5)
IMM GRANULOCYTES NFR BLD AUTO: 0 % (ref 0–5)
LDLC SERPL CALC-MCNC: 94.4 MG/DL
LYMPHOCYTES # BLD: 1.3 K/UL (ref 0.5–4.6)
LYMPHOCYTES NFR BLD: 27 % (ref 13–44)
MCH RBC QN AUTO: 27.3 PG (ref 26.1–32.9)
MCHC RBC AUTO-ENTMCNC: 31.6 G/DL (ref 31.4–35)
MCV RBC AUTO: 86.4 FL (ref 79.6–97.8)
MONOCYTES # BLD: 0.6 K/UL (ref 0.1–1.3)
MONOCYTES NFR BLD: 12 % (ref 4–12)
NEUTS SEG # BLD: 3 K/UL (ref 1.7–8.2)
NEUTS SEG NFR BLD: 60 % (ref 43–78)
NRBC # BLD: 0 K/UL (ref 0–0.2)
PLATELET # BLD AUTO: 218 K/UL (ref 150–450)
PMV BLD AUTO: 10.9 FL (ref 9.4–12.3)
POTASSIUM SERPL-SCNC: 3.8 MMOL/L (ref 3.5–5.1)
RBC # BLD AUTO: 4.03 M/UL (ref 4.23–5.6)
SODIUM SERPL-SCNC: 143 MMOL/L (ref 138–145)
TRIGL SERPL-MCNC: 48 MG/DL (ref 35–150)
VANCOMYCIN TROUGH SERPL-MCNC: 14.8 UG/ML (ref 5–20)
VLDLC SERPL CALC-MCNC: 9.6 MG/DL (ref 6–23)
WBC # BLD AUTO: 4.9 K/UL (ref 4.3–11.1)

## 2021-07-14 PROCEDURE — 96376 TX/PRO/DX INJ SAME DRUG ADON: CPT

## 2021-07-14 PROCEDURE — 87205 SMEAR GRAM STAIN: CPT

## 2021-07-14 PROCEDURE — 80048 BASIC METABOLIC PNL TOTAL CA: CPT

## 2021-07-14 PROCEDURE — 77030040922 HC BLNKT HYPOTHRM STRY -A: Performed by: ANESTHESIOLOGY

## 2021-07-14 PROCEDURE — 85025 COMPLETE CBC W/AUTO DIFF WBC: CPT

## 2021-07-14 PROCEDURE — 74011000250 HC RX REV CODE- 250: Performed by: NURSE ANESTHETIST, CERTIFIED REGISTERED

## 2021-07-14 PROCEDURE — 87075 CULTR BACTERIA EXCEPT BLOOD: CPT

## 2021-07-14 PROCEDURE — 87176 TISSUE HOMOGENIZATION CULTR: CPT

## 2021-07-14 PROCEDURE — 74011250636 HC RX REV CODE- 250/636: Performed by: ORTHOPAEDIC SURGERY

## 2021-07-14 PROCEDURE — 77030010509 HC AIRWY LMA MSK TELE -A: Performed by: ANESTHESIOLOGY

## 2021-07-14 PROCEDURE — 77030008462 HC STPLR SKN PROX J&J -A: Performed by: ORTHOPAEDIC SURGERY

## 2021-07-14 PROCEDURE — 74011250636 HC RX REV CODE- 250/636: Performed by: INTERNAL MEDICINE

## 2021-07-14 PROCEDURE — 0QBK0ZZ EXCISION OF LEFT FIBULA, OPEN APPROACH: ICD-10-PCS | Performed by: ORTHOPAEDIC SURGERY

## 2021-07-14 PROCEDURE — 77030019908 HC STETH ESOPH SIMS -A: Performed by: ANESTHESIOLOGY

## 2021-07-14 PROCEDURE — 65270000029 HC RM PRIVATE

## 2021-07-14 PROCEDURE — 74011250636 HC RX REV CODE- 250/636: Performed by: NURSE ANESTHETIST, CERTIFIED REGISTERED

## 2021-07-14 PROCEDURE — 77030017016 HC DSG ANTIMIC BARR2 S&N -B: Performed by: ORTHOPAEDIC SURGERY

## 2021-07-14 PROCEDURE — 87077 CULTURE AEROBIC IDENTIFY: CPT

## 2021-07-14 PROCEDURE — 74011000258 HC RX REV CODE- 258: Performed by: INTERNAL MEDICINE

## 2021-07-14 PROCEDURE — 74011250637 HC RX REV CODE- 250/637: Performed by: FAMILY MEDICINE

## 2021-07-14 PROCEDURE — 36415 COLL VENOUS BLD VENIPUNCTURE: CPT

## 2021-07-14 PROCEDURE — 11044 DBRDMT BONE 1ST 20 SQ CM/<: CPT | Performed by: ORTHOPAEDIC SURGERY

## 2021-07-14 PROCEDURE — 87186 SC STD MICRODIL/AGAR DIL: CPT

## 2021-07-14 PROCEDURE — 99218 HC RM OBSERVATION: CPT

## 2021-07-14 PROCEDURE — 77030018836 HC SOL IRR NACL ICUM -A

## 2021-07-14 PROCEDURE — 80202 ASSAY OF VANCOMYCIN: CPT

## 2021-07-14 PROCEDURE — 74011250637 HC RX REV CODE- 250/637: Performed by: INTERNAL MEDICINE

## 2021-07-14 PROCEDURE — 74011250636 HC RX REV CODE- 250/636: Performed by: FAMILY MEDICINE

## 2021-07-14 PROCEDURE — 77030013708 HC HNDPC SUC IRR PULS STRY –B: Performed by: ORTHOPAEDIC SURGERY

## 2021-07-14 PROCEDURE — 2709999900 HC NON-CHARGEABLE SUPPLY: Performed by: ORTHOPAEDIC SURGERY

## 2021-07-14 PROCEDURE — 76210000016 HC OR PH I REC 1 TO 1.5 HR: Performed by: ORTHOPAEDIC SURGERY

## 2021-07-14 PROCEDURE — 74011250636 HC RX REV CODE- 250/636: Performed by: ANESTHESIOLOGY

## 2021-07-14 PROCEDURE — 80061 LIPID PANEL: CPT

## 2021-07-14 PROCEDURE — 76060000033 HC ANESTHESIA 1 TO 1.5 HR: Performed by: ORTHOPAEDIC SURGERY

## 2021-07-14 PROCEDURE — 76010000161 HC OR TIME 1 TO 1.5 HR INTENSV-TIER 1: Performed by: ORTHOPAEDIC SURGERY

## 2021-07-14 RX ORDER — SODIUM CHLORIDE, SODIUM LACTATE, POTASSIUM CHLORIDE, CALCIUM CHLORIDE 600; 310; 30; 20 MG/100ML; MG/100ML; MG/100ML; MG/100ML
25 INJECTION, SOLUTION INTRAVENOUS CONTINUOUS
Status: DISCONTINUED | OUTPATIENT
Start: 2021-07-14 | End: 2021-07-15

## 2021-07-14 RX ORDER — ONDANSETRON 2 MG/ML
INJECTION INTRAMUSCULAR; INTRAVENOUS AS NEEDED
Status: DISCONTINUED | OUTPATIENT
Start: 2021-07-14 | End: 2021-07-14 | Stop reason: HOSPADM

## 2021-07-14 RX ORDER — PROPOFOL 10 MG/ML
INJECTION, EMULSION INTRAVENOUS AS NEEDED
Status: DISCONTINUED | OUTPATIENT
Start: 2021-07-14 | End: 2021-07-14 | Stop reason: HOSPADM

## 2021-07-14 RX ORDER — FENTANYL CITRATE 50 UG/ML
INJECTION, SOLUTION INTRAMUSCULAR; INTRAVENOUS AS NEEDED
Status: DISCONTINUED | OUTPATIENT
Start: 2021-07-14 | End: 2021-07-14 | Stop reason: HOSPADM

## 2021-07-14 RX ORDER — HYDROMORPHONE HYDROCHLORIDE 2 MG/ML
0.5 INJECTION, SOLUTION INTRAMUSCULAR; INTRAVENOUS; SUBCUTANEOUS
Status: DISCONTINUED | OUTPATIENT
Start: 2021-07-14 | End: 2021-07-16 | Stop reason: HOSPADM

## 2021-07-14 RX ORDER — LIDOCAINE HYDROCHLORIDE 20 MG/ML
INJECTION, SOLUTION EPIDURAL; INFILTRATION; INTRACAUDAL; PERINEURAL AS NEEDED
Status: DISCONTINUED | OUTPATIENT
Start: 2021-07-14 | End: 2021-07-14 | Stop reason: HOSPADM

## 2021-07-14 RX ORDER — GLYCOPYRROLATE 0.2 MG/ML
INJECTION INTRAMUSCULAR; INTRAVENOUS AS NEEDED
Status: DISCONTINUED | OUTPATIENT
Start: 2021-07-14 | End: 2021-07-14 | Stop reason: HOSPADM

## 2021-07-14 RX ORDER — HYDRALAZINE HYDROCHLORIDE 20 MG/ML
10 INJECTION INTRAMUSCULAR; INTRAVENOUS
Status: DISCONTINUED | OUTPATIENT
Start: 2021-07-14 | End: 2021-07-16 | Stop reason: HOSPADM

## 2021-07-14 RX ADMIN — ONDANSETRON 8 MG: 2 INJECTION INTRAMUSCULAR; INTRAVENOUS at 14:55

## 2021-07-14 RX ADMIN — PROPOFOL 200 MG: 10 INJECTION, EMULSION INTRAVENOUS at 14:49

## 2021-07-14 RX ADMIN — SODIUM CHLORIDE 75 ML/HR: 900 INJECTION, SOLUTION INTRAVENOUS at 18:13

## 2021-07-14 RX ADMIN — ATORVASTATIN CALCIUM 40 MG: 40 TABLET, FILM COATED ORAL at 21:21

## 2021-07-14 RX ADMIN — Medication 10 ML: at 21:22

## 2021-07-14 RX ADMIN — Medication 1 AMPULE: at 08:42

## 2021-07-14 RX ADMIN — VANCOMYCIN HYDROCHLORIDE 1250 MG: 10 INJECTION, POWDER, LYOPHILIZED, FOR SOLUTION INTRAVENOUS at 05:55

## 2021-07-14 RX ADMIN — VANCOMYCIN HYDROCHLORIDE 1250 MG: 10 INJECTION, POWDER, LYOPHILIZED, FOR SOLUTION INTRAVENOUS at 18:23

## 2021-07-14 RX ADMIN — SODIUM CHLORIDE, SODIUM LACTATE, POTASSIUM CHLORIDE, AND CALCIUM CHLORIDE 25 ML/HR: 600; 310; 30; 20 INJECTION, SOLUTION INTRAVENOUS at 14:34

## 2021-07-14 RX ADMIN — PIPERACILLIN AND TAZOBACTAM 4.5 G: 4; .5 INJECTION, POWDER, FOR SOLUTION INTRAVENOUS at 18:13

## 2021-07-14 RX ADMIN — HYDROMORPHONE HYDROCHLORIDE 0.5 MG: 2 INJECTION INTRAMUSCULAR; INTRAVENOUS; SUBCUTANEOUS at 16:12

## 2021-07-14 RX ADMIN — Medication 10 ML: at 05:42

## 2021-07-14 RX ADMIN — PIPERACILLIN AND TAZOBACTAM 4.5 G: 4; .5 INJECTION, POWDER, FOR SOLUTION INTRAVENOUS at 08:42

## 2021-07-14 RX ADMIN — PIPERACILLIN AND TAZOBACTAM 4.5 G: 4; .5 INJECTION, POWDER, FOR SOLUTION INTRAVENOUS at 23:51

## 2021-07-14 RX ADMIN — FENTANYL CITRATE 100 MCG: 50 INJECTION INTRAMUSCULAR; INTRAVENOUS at 15:12

## 2021-07-14 RX ADMIN — HYDRALAZINE HYDROCHLORIDE 10 MG: 20 INJECTION, SOLUTION INTRAMUSCULAR; INTRAVENOUS at 16:32

## 2021-07-14 RX ADMIN — GLYCOPYRROLATE 0.2 MG: 0.2 INJECTION, SOLUTION INTRAMUSCULAR; INTRAVENOUS at 15:27

## 2021-07-14 RX ADMIN — LIDOCAINE HYDROCHLORIDE 100 MG: 20 INJECTION, SOLUTION EPIDURAL; INFILTRATION; INTRACAUDAL; PERINEURAL at 14:49

## 2021-07-14 RX ADMIN — SODIUM CHLORIDE, SODIUM LACTATE, POTASSIUM CHLORIDE, AND CALCIUM CHLORIDE: 600; 310; 30; 20 INJECTION, SOLUTION INTRAVENOUS at 14:43

## 2021-07-14 RX ADMIN — Medication 1 AMPULE: at 21:22

## 2021-07-14 NOTE — PROGRESS NOTES
Pharmacokinetic Consult to Pharmacist    Therese Gomes is a 74 y.o. male being treated with vancomycin. Height: 6' 1\" (185.4 cm)  Weight: 81.6 kg (180 lb)  Lab Results   Component Value Date/Time    BUN 13 07/14/2021 04:22 AM    Creatinine 1.02 07/14/2021 04:22 AM    WBC 4.9 07/14/2021 04:22 AM    Procalcitonin <0.05 07/12/2021 04:23 PM    Lactic acid 1.5 04/26/2020 04:07 PM      Estimated Creatinine Clearance: 78.3 mL/min (based on SCr of 1.02 mg/dL). Lab Results   Component Value Date/Time    Vancomycin,trough 14.8 07/14/2021 04:22 AM       Day 2 of vancomycin. Goal trough is 15-20. Vancomycin trough resulted slightly below goal at 14.8. Given increasing trend in Cr and level nearly at goal, will continue current dose of 1250 mg Q12H and give next dose 2 hours earlier to bump level up to goal.    Levels will be ordered as clinically indicated. Pharmacy will continue to follow. Please call with any questions.     Thank you,  Blaine Cota, PharmD, [id number]  Clinical Pharmacist  [phone]

## 2021-07-14 NOTE — PROGRESS NOTES
TRANSFER - IN REPORT:    Verbal report received from Joey(name) on Elian Mar  being received from PACU(unit) for routine progression of care      Report consisted of patients Situation, Background, Assessment and   Recommendations(SBAR). Information from the following report(s) SBAR, Kardex, OR Summary, Intake/Output, MAR and Recent Results was reviewed with the receiving nurse. Opportunity for questions and clarification was provided. Assessment completed upon patients arrival to unit and care assumed.

## 2021-07-14 NOTE — PROGRESS NOTES
Hourly rounds complete this shift. Patient alert and oriented x4. No new complaints at this time. Bed in low, locked position, call light and bedside table within reach. Patient in bed resting. Prescribed medications given. Wound dressing clean, dry, and, intact. IV clean, dry, and intact. IV infusing. Will continue to monitor. Report given to night shift nurse.

## 2021-07-14 NOTE — ANESTHESIA POSTPROCEDURE EVALUATION
Procedure(s):  LEFT ANKLE INCISION AND DRAINAGE CHOICE ANES/ ROOM 617. general    Anesthesia Post Evaluation      Multimodal analgesia: multimodal analgesia used between 6 hours prior to anesthesia start to PACU discharge  Patient location during evaluation: PACU  Patient participation: complete - patient participated  Level of consciousness: awake  Pain management: adequate  Airway patency: patent  Anesthetic complications: no  Cardiovascular status: acceptable  Respiratory status: acceptable  Hydration status: acceptable  Post anesthesia nausea and vomiting:  none  Final Post Anesthesia Temperature Assessment:  Normothermia (36.0-37.5 degrees C)      INITIAL Post-op Vital signs:   Vitals Value Taken Time   /89 07/14/21 1652   Temp 36.4 °C (97.5 °F) 07/14/21 1645   Pulse 70 07/14/21 1654   Resp 16 07/14/21 1650   SpO2 95 % 07/14/21 1654   Vitals shown include unvalidated device data.

## 2021-07-14 NOTE — PROGRESS NOTES
Problem: Pain  Goal: *Control of Pain  Outcome: Progressing Towards Goal     Problem: Patient Education: Go to Patient Education Activity  Goal: Patient/Family Education  Outcome: Progressing Towards Goal     Problem: Falls - Risk of  Goal: *Absence of Falls  Description: Document Kyrie Norman Fall Risk and appropriate interventions in the flowsheet. Outcome: Progressing Towards Goal  Note: Fall Risk Interventions:            Medication Interventions: Teach patient to arise slowly                   Problem: Patient Education: Go to Patient Education Activity  Goal: Patient/Family Education  Outcome: Progressing Towards Goal     Problem: Nausea/Vomiting (Adult)  Goal: *Absence of nausea/vomiting  Outcome: Progressing Towards Goal     Problem: Patient Education: Go to Patient Education Activity  Goal: Patient/Family Education  Outcome: Progressing Towards Goal     Problem: General Infection Care Plan (Adult and Pediatric)  Goal: Improvement in signs and symptoms of infection  Outcome: Progressing Towards Goal  Goal: *Optimize nutritional status  Outcome: Progressing Towards Goal     Problem: Patient Education: Go to Patient Education Activity  Goal: Patient/Family Education  Outcome: Progressing Towards Goal     Problem: Risk for Spread of Infection  Goal: Prevent transmission of infectious organism to others  Description: Prevent the transmission of infectious organisms to other patients, staff members, and visitors.   Outcome: Progressing Towards Goal     Problem: Patient Education:  Go to Education Activity  Goal: Patient/Family Education  Outcome: Progressing Towards Goal

## 2021-07-14 NOTE — PROGRESS NOTES
Problem: Pain  Goal: *Control of Pain  Outcome: Progressing Towards Goal     Problem: Falls - Risk of  Goal: *Absence of Falls  Description: Document Bernadine Fall Risk and appropriate interventions in the flowsheet.   Outcome: Progressing Towards Goal  Note: Fall Risk Interventions:            Medication Interventions: Teach patient to arise slowly                   Problem: Nausea/Vomiting (Adult)  Goal: *Absence of nausea/vomiting  Outcome: Progressing Towards Goal     Problem: General Infection Care Plan (Adult and Pediatric)  Goal: Improvement in signs and symptoms of infection  Outcome: Progressing Towards Goal  Goal: *Optimize nutritional status  Outcome: Progressing Towards Goal

## 2021-07-14 NOTE — PERIOP NOTES
TRANSFER - OUT REPORT:    Verbal report given to RIRI Castañeda on Lily Chew being transferred to room 617 for routine post - op. Report consisted of patients Situation, Background, Assessment and Recommendations (SBAR). Information from the following report(s) SBAR, OR Summary, Procedure Summary, Intake/Output, MAR and Recent Results was reviewed with the receiving nurse. Lines:   Peripheral IV 07/12/21 Right Forearm (Active)   Site Assessment Clean, dry, & intact 07/14/21 1515   Phlebitis Assessment 0 07/14/21 1515   Infiltration Assessment 0 07/14/21 1515   Dressing Status Clean, dry, & intact 07/14/21 1515   Dressing Type Tape;Transparent 07/14/21 1515   Hub Color/Line Status Infusing 07/14/21 1515   Alcohol Cap Used No 07/14/21 1515        Opportunity for questions and clarification was provided. Patient transported with:   O2 @ 0 liters    VTE prophylaxis orders have been written for Lily Chew. Patient and family given floor number and nurses name. Family updated re: pt status after security code verified.

## 2021-07-14 NOTE — INTERVAL H&P NOTE
Update History & Physical    The Patient's History and Physical of 7/12/2021 was reviewed with the patient and I examined the patient. There was no change. The surgical site was confirmed by the patient and me. Plan:  The risk, benefits, expected outcome, and alternative to the recommended procedure have been discussed with the patient. Patient understands and wants to proceed with debridement of left ankle wound today in the operating room.   Electronically signed by Shravan Rao MD on 7/14/2021 at 2:40 PM

## 2021-07-14 NOTE — PERIOP NOTES
TRANSFER - IN REPORT:    Verbal report received from Kaylin RN(name) on Rodney Zaragoza  being received from 617(unit) for ordered procedure      Report consisted of patients Situation, Background, Assessment and   Recommendations(SBAR). Information from the following report(s) SBAR was reviewed with the receiving nurse. Opportunity for questions and clarification was provided.

## 2021-07-14 NOTE — PROGRESS NOTES
Hospitalist Progress Note     Name:  Erick Garza  Age:66 y.o. Sex:male   :  1955    MRN:  [id number]   Admit Date:  2021  Presenting Complaint: No chief complaint on file. Initial Admission Diagnosis: Osteomyelitis [facility]) [M86.9]     Hospital Course/Interval history:     Erick Garza is a 74 y.o. male with medical history of hypertension who presented as direct admission from Dr. Nik Leslie. Pt says he also h/o cva. Says not on any medications at home. Based on pcp notes- h/o rt carotid artery occlusion[2016],htn,hyperlipidemia and cva [2016]  Pt admitted for left ankle cellulitis to r/o osteomyelitis. Previous h/o leaving AMA after left ankle debridement and not completing the antibiotics. Subjective (21):  Patient seen and examined, denies fevers, abdominal pain, chest pain, vomiting. Says doing ok  Mild left ankle pain  Xray negative for osteomyelitis  Awaiting debridement by Ortho. Review of Systems: 14 point review of systems is otherwise negative with the exception of the elements mentioned above. Assessment & Plan   # L Ankle cellulitis  Highly suspect chronic osteomyelitis- xray negative for osteo  Orthopedic will take pt for debridement in am  - IV antbx vanco/zosyn       # hx of HTN/hyperlipedemia/cva- not on any medications at home  -Was started on asa 81 mg, lisinopril for htn and lipitor for hyperlipidemia  - controlled currently, monitor     # tobacco abuse  - counseled.      High risk patient  Diet:  DIET NPO  DVT PPx: lovenox- held for debridement in am  Code status: Full Code    Objective:     Patient Vitals for the past 24 hrs:   Temp Pulse Resp BP SpO2   21 0816 97.3 °F (36.3 °C) (!) 57 18 (!) 151/88 99 %   21 0535 98 °F (36.7 °C) 63 18 130/71 100 %   21 0041 98.4 °F (36.9 °C) 64 18 (!) 146/91 99 %   21 97.9 °F (36.6 °C) 73 18 (!) 140/92 98 %   21 1602 98.4 °F (36.9 °C) 84 17 (!) 147/97 99 %   21 1112 98.2 °F (36.8 °C) 77 17 (!) 142/89 99 %     Oxygen Therapy  O2 Sat (%): 99 % (07/14/21 0816)  O2 Device: None (Room air) (07/14/21 0715)    Body mass index is 23.75 kg/m². Physical Exam:   General:  No acute distress, speaking in full sentences, no use of accessory muscles   HEENT- pupils equal reacting to light, neck supple, throat normal  Lungs:  Clear to auscultation bilaterally   CV:  Regular rate and rhythm with normal S1 and S2   Abdomen:  Soft, nontender, nondistended, normoactive bowel sounds   Extremities:  No cyanosis clubbing or edema   Neuro:   Nonfocal, A&O x3   Psych:  Normal affect   Skin- left ankle wound- lateral aspect,malleolar region, dressing in place, mild tenderness on palpation    Data Review:  I have reviewed all labs, meds, and studies from the last 24 hours:    Labs:    Recent Results (from the past 24 hour(s))   CBC WITH AUTOMATED DIFF    Collection Time: 07/14/21  4:22 AM   Result Value Ref Range    WBC 4.9 4.3 - 11.1 K/uL    RBC 4.03 (L) 4.23 - 5.6 M/uL    HGB 11.0 (L) 13.6 - 17.2 g/dL    HCT 34.8 (L) 41.1 - 50.3 %    MCV 86.4 79.6 - 97.8 FL    MCH 27.3 26.1 - 32.9 PG    MCHC 31.6 31.4 - 35.0 g/dL    RDW 13.4 11.9 - 14.6 %    PLATELET 290 351 - 274 K/uL    MPV 10.9 9.4 - 12.3 FL    ABSOLUTE NRBC 0.00 0.0 - 0.2 K/uL    DF AUTOMATED      NEUTROPHILS 60 43 - 78 %    LYMPHOCYTES 27 13 - 44 %    MONOCYTES 12 4.0 - 12.0 %    EOSINOPHILS 1 0.5 - 7.8 %    BASOPHILS 0 0.0 - 2.0 %    IMMATURE GRANULOCYTES 0 0.0 - 5.0 %    ABS. NEUTROPHILS 3.0 1.7 - 8.2 K/UL    ABS. LYMPHOCYTES 1.3 0.5 - 4.6 K/UL    ABS. MONOCYTES 0.6 0.1 - 1.3 K/UL    ABS. EOSINOPHILS 0.0 0.0 - 0.8 K/UL    ABS. BASOPHILS 0.0 0.0 - 0.2 K/UL    ABS. IMM.  GRANS. 0.0 0.0 - 0.5 K/UL   METABOLIC PANEL, BASIC    Collection Time: 07/14/21  4:22 AM   Result Value Ref Range    Sodium 143 138 - 145 mmol/L    Potassium 3.8 3.5 - 5.1 mmol/L    Chloride 110 (H) 98 - 107 mmol/L    CO2 28 21 - 32 mmol/L    Anion gap 5 (L) 7 - 16 mmol/L    Glucose 98 65 - 100 mg/dL    BUN 13 8 - 23 MG/DL    Creatinine 1.02 0.8 - 1.5 MG/DL    GFR est AA >60 >60 ml/min/1.73m2    GFR est non-AA >60 >60 ml/min/1.73m2    Calcium 8.9 8.3 - 10.4 MG/DL   LIPID PANEL    Collection Time: 07/14/21  4:22 AM   Result Value Ref Range    Cholesterol, total 147 <200 MG/DL    Triglyceride 48 35 - 150 MG/DL    HDL Cholesterol 43 40 - 60 MG/DL    LDL, calculated 94.4 <100 MG/DL    VLDL, calculated 9.6 6.0 - 23.0 MG/DL    CHOL/HDL Ratio 3.4     VANCOMYCIN, TROUGH    Collection Time: 07/14/21  4:22 AM   Result Value Ref Range    Vancomycin,trough 14.8 5 - 20 ug/mL       All Micro Results     Procedure Component Value Units Date/Time    CULTURE, BLOOD [[id number]] Collected: 07/12/21 1624    Order Status: Completed Specimen: Blood Updated: 07/14/21 0714     Special Requests: --        LEFT  Antecubital       Culture result: NO GROWTH 2 DAYS       CULTURE, BLOOD [[id number]] Collected: 07/12/21 1624    Order Status: Completed Specimen: Blood Updated: 07/14/21 0714     Special Requests: --        RIGHT  Antecubital       Culture result: NO GROWTH 2 DAYS       CULTURE, Evelio Fonsecarick STAIN [[id number]] Collected: 07/13/21 0610    Order Status: Completed Specimen: Wound from Ankle Updated: 07/13/21 1314     Special Requests: NO SPECIAL REQUESTS        GRAM STAIN 0 TO 12 WBCS OIF      FEW GRAM POSITIVE COCCI        Culture result:       NO GROWTH AFTER SHORT PERIOD OF INCUBATION. FURTHER RESULTS TO FOLLOW AFTER OVERNIGHT INCUBATION. EKG Results     None          Other Studies:  No results found.     Current Meds:   Current Facility-Administered Medications   Medication Dose Route Frequency    0.9% sodium chloride infusion  75 mL/hr IntraVENous CONTINUOUS    lisinopriL (PRINIVIL, ZESTRIL) tablet 10 mg  10 mg Oral DAILY    aspirin delayed-release tablet 81 mg  81 mg Oral DAILY    atorvastatin (LIPITOR) tablet 40 mg  40 mg Oral QHS    alcohol 62% (NOZIN) nasal  1 Ampule  1 Ampule Topical Q12H    sodium chloride (NS) flush 5-40 mL  5-40 mL IntraVENous Q8H    sodium chloride (NS) flush 5-40 mL  5-40 mL IntraVENous PRN    acetaminophen (TYLENOL) tablet 650 mg  650 mg Oral Q6H PRN    Or    acetaminophen (TYLENOL) suppository 650 mg  650 mg Rectal Q6H PRN    polyethylene glycol (MIRALAX) packet 17 g  17 g Oral DAILY PRN    ondansetron (ZOFRAN ODT) tablet 4 mg  4 mg Oral Q8H PRN    Or    ondansetron (ZOFRAN) injection 4 mg  4 mg IntraVENous Q6H PRN    [Held by provider] enoxaparin (LOVENOX) injection 40 mg  40 mg SubCUTAneous DAILY    piperacillin-tazobactam (ZOSYN) 4.5 g in 0.9% sodium chloride (MBP/ADV) 100 mL MBP  4.5 g IntraVENous Q8H    vancomycin (VANCOCIN) 1250 mg in  ml infusion  1,250 mg IntraVENous Q12H       Problem List:  Hospital Problems as of 7/14/2021 Never Reviewed        Codes Class Noted - Resolved POA    HTN (hypertension) ICD-10-CM: I10  ICD-9-CM: 401.9  7/13/2021 - Present Unknown        Hyperlipidemia ICD-10-CM: E78.5  ICD-9-CM: 272.4  7/13/2021 - Present Unknown        H/O: CVA (cerebrovascular accident) ICD-10-CM: Z86.73  ICD-9-CM: V12.54  7/13/2021 - Present Unknown        Right internal carotid occlusion ICD-10-CM: I65.21  ICD-9-CM: 433.10  7/13/2021 - Present Unknown        Cellulitis ICD-10-CM: L03.90  ICD-9-CM: 682.9  7/12/2021 - Present Unknown        Tobacco abuse ICD-10-CM: Z72.0  ICD-9-CM: 305.1  7/12/2021 - Present Unknown        Non-compliant patient ICD-10-CM: Z91.19  ICD-9-CM: V15.81  5/22/2020 - Present Yes        Osteomyelitis ([address] ICD-10-CM: M86.9  ICD-9-CM: 730.20  4/30/2020 - Present Unknown                   Signed By: Jong Wu MD   [employer] [facility]    July 14, 2021

## 2021-07-14 NOTE — OP NOTES
Operative Report    Patient: Lakeisha Flanagan MRN: [id number]  SSN: [id number]    YOB: 1955  Age: 74 y.o. Sex: male       Date of Surgery: 7/14/2021     History:  Lakeisha Flanagan is a 74 y.o. male who had open reduction internal fixation of the left lateral knee this fracture another facility approximately 2 years prior at least and then developed an ankle infection that was treated by myself with hardware removal and debridement. Is unclear if he really was able to finish his course of antibiotics. He is actually done pretty well for some time until just the last week or so whenever he has been having increasing drainage and pain from his left ankle. He presents today for repeat debridement left ankle wound. I talked to the patient and/or their representative and explained the exact nature the procedure. I also went through a detailed list of the material risks associated with  the procedure which included risk of bleeding, infection, injury to nearby structures, worsening the situation, as well as the risks associate with anesthesia and finally death. Also talked with him regarding the benefits and alternatives to the procedure. Preoperative Diagnosis: Osteomyelitis of left ankle, unspecified type (HCC) [M86.9]     Postoperative Diagnosis: Osteomyelitis of left ankle, unspecified type (HCC) [M86.9]     Surgeon(s) and Role:     * Savanna Perkins MD - Primary    Anesthesia: General     Procedure: Procedure(s):  Excisional debridement left ankle with debridement of skin subcutaneous tissue and bone    Procedure in Detail: After the successful duction of general anesthetic left lower extremities prepped draped usual sterile fashion. I then used a #10 blade to ellipse out a small area of skin from around the draining sinus tract over the lateral malleolus.   After doing this I then used a rongeur to remove skin subcutaneous tissue and then actually a little bit of the bone from the lateral malleolus. The deep tissue as well as the bone was sent for culture. The total area of debridement was approximately 3 cm x 2 cm and to a depth of about 2 cm and again the rongeur was felt was used to excise most of the deep tissue the exposed fibula had a small defect laterally and this was would allow me to use the rongeur to take some of this and sent for culture I then irrigate with copious amount normal saline and then placed a 1/2 inch iodoform gauze in the wound and partially closed this with horizontal mattress 0 Prolene sutures. Dressings were applied the patient was awakened taken recovery in stable condition in no apparent complications      Estimated Blood Loss: 90 cc    Tourniquet Time: * Missing tourniquet times found for documented tourniquets in lo *      Implants: * No implants in log *            Specimens:   ID Type Source Tests Collected by Time Destination   1 : Left Fibula Wound Ankle CULTURE, ANAEROBIC, CULTURE, WOUND W GRAM STAIN Harry Salguero MD 2021 1512 Microbiology   2 : Deep Tissue Left Ankle Wound Ankle CULTURE, WOUND W GRAM STAIN, CULTURE, ANAEROBIC Harry Salguero MD 2021 1523 Microbiology           Drains: None                Complications: None    Counts: Sponge and needle counts were correct times two.     Signed By:  Valerie Medina MD     2021

## 2021-07-14 NOTE — INTERVAL H&P NOTE
Update History & Physical  The Patient's History and Physical of 7/12/2021 was reviewed with the patient and I examined the patient. There was no change. The surgical site was confirmed by the patient and me. Plan:  The risk, benefits, expected outcome, and alternative to the recommended procedure have been discussed with the patient. Patient understands and wants to proceed with debridement of left ankle wound.     Electronically signed by Kennedi Lindsay MD on 7/14/2021 at 6:54 AM

## 2021-07-15 LAB
CREAT SERPL-MCNC: 1.27 MG/DL (ref 0.8–1.5)
GLUCOSE BLD STRIP.AUTO-MCNC: 92 MG/DL (ref 65–100)
SERVICE CMNT-IMP: NORMAL
VANCOMYCIN TROUGH SERPL-MCNC: 19.5 UG/ML (ref 5–20)

## 2021-07-15 PROCEDURE — 74011000258 HC RX REV CODE- 258: Performed by: INTERNAL MEDICINE

## 2021-07-15 PROCEDURE — 74011250637 HC RX REV CODE- 250/637: Performed by: FAMILY MEDICINE

## 2021-07-15 PROCEDURE — 74011250636 HC RX REV CODE- 250/636: Performed by: INTERNAL MEDICINE

## 2021-07-15 PROCEDURE — 77030018836 HC SOL IRR NACL ICUM -A

## 2021-07-15 PROCEDURE — 82565 ASSAY OF CREATININE: CPT

## 2021-07-15 PROCEDURE — 36415 COLL VENOUS BLD VENIPUNCTURE: CPT

## 2021-07-15 PROCEDURE — 80202 ASSAY OF VANCOMYCIN: CPT

## 2021-07-15 PROCEDURE — 82962 GLUCOSE BLOOD TEST: CPT

## 2021-07-15 PROCEDURE — 74011250636 HC RX REV CODE- 250/636: Performed by: ORTHOPAEDIC SURGERY

## 2021-07-15 PROCEDURE — 74011250637 HC RX REV CODE- 250/637: Performed by: ORTHOPAEDIC SURGERY

## 2021-07-15 PROCEDURE — 74011250637 HC RX REV CODE- 250/637: Performed by: INTERNAL MEDICINE

## 2021-07-15 PROCEDURE — 65270000029 HC RM PRIVATE

## 2021-07-15 PROCEDURE — 74011000258 HC RX REV CODE- 258: Performed by: ORTHOPAEDIC SURGERY

## 2021-07-15 RX ORDER — VANCOMYCIN HYDROCHLORIDE
1250
Status: DISCONTINUED | OUTPATIENT
Start: 2021-07-15 | End: 2021-07-16 | Stop reason: HOSPADM

## 2021-07-15 RX ADMIN — Medication 1 AMPULE: at 08:45

## 2021-07-15 RX ADMIN — ATORVASTATIN CALCIUM 40 MG: 40 TABLET, FILM COATED ORAL at 21:04

## 2021-07-15 RX ADMIN — ASPIRIN 81 MG: 81 TABLET ORAL at 08:45

## 2021-07-15 RX ADMIN — Medication 10 ML: at 15:04

## 2021-07-15 RX ADMIN — Medication 10 ML: at 05:37

## 2021-07-15 RX ADMIN — Medication 10 ML: at 21:07

## 2021-07-15 RX ADMIN — PIPERACILLIN AND TAZOBACTAM 4.5 G: 4; .5 INJECTION, POWDER, FOR SOLUTION INTRAVENOUS at 08:44

## 2021-07-15 RX ADMIN — LISINOPRIL 10 MG: 5 TABLET ORAL at 08:45

## 2021-07-15 RX ADMIN — PIPERACILLIN AND TAZOBACTAM 4.5 G: 4; .5 INJECTION, POWDER, FOR SOLUTION INTRAVENOUS at 16:10

## 2021-07-15 RX ADMIN — VANCOMYCIN HYDROCHLORIDE 1250 MG: 10 INJECTION, POWDER, LYOPHILIZED, FOR SOLUTION INTRAVENOUS at 21:09

## 2021-07-15 RX ADMIN — PIPERACILLIN AND TAZOBACTAM 4.5 G: 4; .5 INJECTION, POWDER, FOR SOLUTION INTRAVENOUS at 23:47

## 2021-07-15 RX ADMIN — Medication 1 AMPULE: at 21:04

## 2021-07-15 RX ADMIN — VANCOMYCIN HYDROCHLORIDE 1250 MG: 10 INJECTION, POWDER, LYOPHILIZED, FOR SOLUTION INTRAVENOUS at 03:40

## 2021-07-15 NOTE — PROGRESS NOTES
Progress Note    Patient: Jaime Tarango MRN: [id number]  SSN: [id number]    YOB: 1955  Age: 74 y.o. Sex: male      Admit Date: 7/12/2021    LOS: 1 day     Subjective:     Patient is complaining of some pain in his left ankle but pain medication is helping    Objective:     Vitals:    07/14/21 2000 07/14/21 2314 07/15/21 0343 07/15/21 0719   BP: (!) 155/74 (!) 159/82 (!) 140/75    Pulse: (!) 57 85 74 83   Resp: 16 20 18    Temp: 98.5 °F (36.9 °C) 98 °F (36.7 °C) 98 °F (36.7 °C)    SpO2: 95% 96% 95% 100%   Weight:       Height:            Intake and Output:  Current Shift: 07/15 0701 - 07/15 1900  In: -   Out: 675 [Urine:675]  Last three shifts: 07/13 1901 - 07/15 0700  In: 490 [P.O.:240; I.V.:250]  Out: 500 [Urine:500]    alert and oriented to at least person  Skin - dressing clean dry and intact  Motor and sensory function intact in LEFT LOWER extremity  Pulses palpable in LEFT LOWER extremity       Lab/Data Review:  CBC: No results found for: WBC, HGB, HGBEXT, HCT, HCTEXT, PLT, PLTEXT, HGBEXT, HCTEXT, PLTEXT       Assessment:     Acute postop blood loss anemia with CBC still pending this morning, POD #1 from I&D left ankle    Plan:     I think it is reasonable just to continue to wait on his culture results. Continue his current antibiotics. He would likely need his packing removed tomorrow and then just a dry dressing change daily and as needed.   He can be weightbearing as tolerated left lower extremity    Signed By: Nita Almaraz MD     July 15, 2021

## 2021-07-15 NOTE — PROGRESS NOTES
Hourly rounds complete this shift. Patient alert and oriented x4. No new complaints at this time. Bed in low, locked position, call light and bedside table within reach. Patient in bed resting. Prescribed medications given. IV clean, dry, and intact. IV infusing. Will continue to monitor. Report given to night shift nurse.

## 2021-07-15 NOTE — PROGRESS NOTES
Problem: Falls - Risk of  Goal: *Absence of Falls  Description: Document Kalyani Patricio Fall Risk and appropriate interventions in the flowsheet. Outcome: Progressing Towards Goal  Note: Fall Risk Interventions:            Medication Interventions: Teach patient to arise slowly, Patient to call before getting OOB                   Problem: General Infection Care Plan (Adult and Pediatric)  Goal: Improvement in signs and symptoms of infection  Outcome: Progressing Towards Goal  Goal: *Optimize nutritional status  Outcome: Progressing Towards Goal     Problem: Risk for Spread of Infection  Goal: Prevent transmission of infectious organism to others  Description: Prevent the transmission of infectious organisms to other patients, staff members, and visitors.   Outcome: Progressing Towards Goal

## 2021-07-15 NOTE — PROGRESS NOTES
Pharmacokinetic Consult to Pharmacist    Abeba Oneal is a 74 y.o. male being treated for osteomyelitis with vancomycin. Height: 6' 1\" (185.4 cm)  Weight: 81.6 kg (180 lb)  Lab Results   Component Value Date/Time    BUN 13 07/14/2021 04:22 AM    Creatinine 1.27 07/15/2021 05:25 AM    WBC 4.9 07/14/2021 04:22 AM    Procalcitonin <0.05 07/12/2021 04:23 PM    Lactic acid 1.5 04/26/2020 04:07 PM      Estimated Creatinine Clearance: 62.9 mL/min (based on SCr of 1.27 mg/dL).     CULTURES:  Results     Procedure Component Value Units Date/Time    CULTURE, Corinda Susannah STAIN [[id number]] Collected: 07/14/21 1530    Order Status: Canceled Specimen: Wound from Ankle     CULTURE, Corinda Susannah STAIN [[id number]] Collected: 07/14/21 1530    Order Status: Canceled Specimen: Wound from Ankle     CULTURE, ANAEROBIC [[id number]] Collected: 07/14/21 1523    Order Status: Completed Specimen: Ankle Updated: 07/14/21 1621    CULTURE, TISSUE Rockwell Vida STAIN [[id number]]  (Abnormal) Collected: 07/14/21 1523    Order Status: Completed Specimen: Left Updated: 07/15/21 1243     Special Requests: NO SPECIAL REQUESTS        GRAM STAIN NO WBCS SEEN         NO DEFINITE ORGANISM SEEN        Culture result:       SCANT GRAM POSITIVE COCCI SUBCULTURE IN PROGRESS                  CULTURE IN PROGRESS,FURTHER UPDATES TO FOLLOW          CULTURE, ANAEROBIC [[id number]] Collected: 07/14/21 1512    Order Status: Completed Specimen: Ankle Updated: 07/14/21 1621    CULTURE, TISSUE W Ian Jean Carlos [[id number]] Collected: 07/14/21 1512    Order Status: Completed Specimen: Left Updated: 07/15/21 1242     Special Requests: NO SPECIAL REQUESTS        GRAM STAIN NO WBCS SEEN         NO DEFINITE ORGANISM SEEN        Culture result: NO GROWTH 1 DAY       CULTURE, Corinda Susannah STAIN [[id number]]  (Abnormal) Collected: 07/13/21 0610    Order Status: Completed Specimen: Wound from Ankle Updated: 07/15/21 0811     Special Requests: NO SPECIAL REQUESTS        GRAM STAIN 0 TO 12 WBCS OIF      FEW GRAM POSITIVE COCCI        Culture result:       LIGHT STAPHYLOCOCCUS AUREUS SENSITIVITY TO FOLLOW          CULTURE, BLOOD [[id number]] Collected: 07/12/21 1624    Order Status: Completed Specimen: Blood Updated: 07/15/21 0703     Special Requests: --        LEFT  Antecubital       Culture result: NO GROWTH 3 DAYS       CULTURE, BLOOD [[id number]] Collected: 07/12/21 1624    Order Status: Completed Specimen: Blood Updated: 07/15/21 0703     Special Requests: --        RIGHT  Antecubital       Culture result: NO GROWTH 3 DAYS               Lab Results   Component Value Date/Time    Vancomycin,trough 19.5 07/15/2021 03:48 PM       Day 4 of vancomycin. Goal trough is 15-20. SCr is trending up and trough is on the high end of normal.  Will decrease vancomycin dose to 1250mg q 18 hours. Will continue to follow patient and order levels when clinically indicated. Thank you,  Dawson Turner.  Shania Albarran, PharmD, BCPS  Clinical Pharmacist

## 2021-07-15 NOTE — PROGRESS NOTES
Hospitalist Progress Note     Name:  Samm Devries  Age:66 y.o. Sex:male   :  1955    MRN:  [id number]   Admit Date:  2021  Presenting Complaint: No chief complaint on file. Initial Admission Diagnosis: Osteomyelitis ([address] [M86.9]  Cellulitis of foot, left [L03.116]     Hospital Course/Interval history:     Samm Devries is a 74 y.o. male with medical history of hypertension who presented as direct admission from Dr. Marium Rueda. Pt says he also h/o cva. Says not on any medications at home. Based on pcp notes- h/o rt carotid artery occlusion[2016],htn,hyperlipidemia and cva [2016]  Pt admitted for left ankle cellulitis to r/o osteomyelitis. Previous h/o leaving AMA after left ankle debridement and not completing the antibiotics. Subjective (07/15/21):  Patient seen and examined, denies fevers, abdominal pain, chest pain, vomiting. Says doing better, seen by Ortho earlier today. Review of Systems: 14 point review of systems is otherwise negative with the exception of the elements mentioned above. Assessment & Plan   # L Ankle cellulitis  Highly suspect chronic osteomyelitis- xray negative for osteo, tissue and bone biopsy pending. Orthopedic plans to remove wound packing tomorrow. WBAT. - IV antbx vanco/zosyn    # hx of HTN/hyperlipedemia/cva- not on any medications at home  -Was started on asa 81 mg, lisinopril for htn and lipitor for hyperlipidemia  - controlled currently, monitor     # tobacco abuse  - counseled. High risk patient  Diet:  ADULT DIET Regular  DVT PPx: lovenox- held for debridement in am  Code status: Full Code  Dispo: Patient wanted to leave AMA last night.   Hopefully DC tomorrow if okay with Ortho  Objective:     Patient Vitals for the past 24 hrs:   Temp Pulse Resp BP SpO2   07/15/21 1012 98 °F (36.7 °C) 63 18 (!) 143/84 100 %   07/15/21 0719 -- 83 -- -- 100 %   07/15/21 0343 98 °F (36.7 °C) 74 18 (!) 140/75 95 %   21 2314 98 °F (36.7 °C) 85 20 (!) 159/82 96 %   07/14/21 2000 98.5 °F (36.9 °C) (!) 57 16 (!) 155/74 95 %   07/14/21 1742 97.5 °F (36.4 °C) 77 17 138/70 95 %   07/14/21 1700 -- 71 17 (!) 165/88 95 %   07/14/21 1655 -- 70 17 (!) 166/89 95 %   07/14/21 1650 -- 72 16 (!) 164/87 94 %   07/14/21 1645 97.5 °F (36.4 °C) 67 17 (!) 177/93 94 %   07/14/21 1640 -- 72 17 (!) 181/95 97 %   07/14/21 1635 -- 75 17 -- 97 %   07/14/21 1630 -- 69 16 (!) 179/107 93 %   07/14/21 1625 -- 71 17 (!) 176/101 92 %   07/14/21 1620 -- 73 18 (!) 187/108 94 %   07/14/21 1615 -- 82 19 (!) 183/99 91 %   07/14/21 1610 -- 75 18 -- 98 %   07/14/21 1605 -- 81 18 (!) 176/102 97 %   07/14/21 1600 -- 82 19 (!) 175/98 95 %   07/14/21 1555 -- 87 19 (!) 167/103 99 %   07/14/21 1551 97 °F (36.1 °C) 80 20 (!) 187/110 99 %   07/14/21 1549 -- 79 -- (!) 187/114 100 %   07/14/21 1419 97.9 °F (36.6 °C) (!) 56 16 (!) 172/94 98 %   07/14/21 1112 97.7 °F (36.5 °C) (!) 57 18 (!) 145/82 98 %     Oxygen Therapy  O2 Sat (%): 100 % (07/15/21 1012)  Pulse via Oximetry: 70 beats per minute (07/14/21 1700)  O2 Device: None (Room air) (07/14/21 1700)  O2 Flow Rate (L/min): 2 l/min (07/14/21 1605)    Body mass index is 23.75 kg/m².     Physical Exam:   General:  No acute distress, speaking in full sentences, no use of accessory muscles   HEENT- pupils equal reacting to light, neck supple, throat normal  Lungs:  Clear to auscultation bilaterally   CV:  Regular rate and rhythm with normal S1 and S2   Abdomen:  Soft, nontender, nondistended, normoactive bowel sounds   Extremities:  No cyanosis clubbing or edema   Neuro:   Nonfocal, A&O x3   Psych:  Normal affect   Skin- left ankle wound- lateral aspect,malleolar region, dressing in place, mild tenderness on palpation    Data Review:  I have reviewed all labs, meds, and studies from the last 24 hours:    Labs:    Recent Results (from the past 24 hour(s))   CREATININE    Collection Time: 07/15/21  5:25 AM   Result Value Ref Range    Creatinine 1.27 0.8 - 1.5 MG/DL       All Micro Results     Procedure Component Value Units Date/Time    CULTURE, Bassam Marrufo STAIN [[id number]]  (Abnormal) Collected: 07/13/21 0610    Order Status: Completed Specimen: Wound from Ankle Updated: 07/15/21 0811     Special Requests: NO SPECIAL REQUESTS        GRAM STAIN 0 TO 12 WBCS OIF      FEW GRAM POSITIVE COCCI        Culture result:       LIGHT STAPHYLOCOCCUS AUREUS SENSITIVITY TO FOLLOW          CULTURE, BLOOD [[id number]] Collected: 07/12/21 1624    Order Status: Completed Specimen: Blood Updated: 07/15/21 0703     Special Requests: --        LEFT  Antecubital       Culture result: NO GROWTH 3 DAYS       CULTURE, BLOOD [[id number]] Collected: 07/12/21 1624    Order Status: Completed Specimen: Blood Updated: 07/15/21 0703     Special Requests: --        RIGHT  Antecubital       Culture result: NO GROWTH 3 DAYS       CULTURE, TISSUE Avtar Sprain [[id number]] Collected: 07/14/21 1523    Order Status: Completed Updated: 07/14/21 1621    CULTURE, ANAEROBIC [[id number]] Collected: 07/14/21 1523    Order Status: Completed Specimen: Ankle Updated: 07/14/21 1621    CULTURE, TISSUE [address] [[id number]] Collected: 07/14/21 1512    Order Status: Completed Updated: 07/14/21 1621    CULTURE, ANAEROBIC [[id number]] Collected: 07/14/21 1512    Order Status: Completed Specimen: Ankle Updated: 07/14/21 1621    CULTURE, Bassam Marrufo STAIN [[id number]] Collected: 07/14/21 1530    Order Status: Canceled Specimen: Wound from Ankle     CULTURE, Bassam Marrufo STAIN [[id number]] Collected: 07/14/21 1530    Order Status: Canceled Specimen: Wound from Ankle           EKG Results     None          Other Studies:  No results found.     Current Meds:   Current Facility-Administered Medications   Medication Dose Route Frequency    lactated Ringers infusion  25 mL/hr IntraVENous CONTINUOUS    HYDROmorphone (DILAUDID) injection 0.5 mg  0.5 mg IntraVENous Q5MIN PRN    hydrALAZINE (APRESOLINE) 20 mg/mL injection 10 mg  10 mg IntraVENous Q15MIN PRN    0.9% sodium chloride infusion  75 mL/hr IntraVENous CONTINUOUS    lisinopriL (PRINIVIL, ZESTRIL) tablet 10 mg  10 mg Oral DAILY    aspirin delayed-release tablet 81 mg  81 mg Oral DAILY    atorvastatin (LIPITOR) tablet 40 mg  40 mg Oral QHS    alcohol 62% (NOZIN) nasal  1 Ampule  1 Ampule Topical Q12H    sodium chloride (NS) flush 5-40 mL  5-40 mL IntraVENous Q8H    sodium chloride (NS) flush 5-40 mL  5-40 mL IntraVENous PRN    acetaminophen (TYLENOL) tablet 650 mg  650 mg Oral Q6H PRN    Or    acetaminophen (TYLENOL) suppository 650 mg  650 mg Rectal Q6H PRN    polyethylene glycol (MIRALAX) packet 17 g  17 g Oral DAILY PRN    ondansetron (ZOFRAN ODT) tablet 4 mg  4 mg Oral Q8H PRN    Or    ondansetron (ZOFRAN) injection 4 mg  4 mg IntraVENous Q6H PRN    [Held by provider] enoxaparin (LOVENOX) injection 40 mg  40 mg SubCUTAneous DAILY    piperacillin-tazobactam (ZOSYN) 4.5 g in 0.9% sodium chloride (MBP/ADV) 100 mL MBP  4.5 g IntraVENous Q8H    vancomycin (VANCOCIN) 1250 mg in  ml infusion  1,250 mg IntraVENous Q12H       Problem List:  Hospital Problems as of 7/15/2021 Date Reviewed: 7/14/2021        Codes Class Noted - Resolved POA    HTN (hypertension) ICD-10-CM: I10  ICD-9-CM: 401.9  7/13/2021 - Present Unknown        Hyperlipidemia ICD-10-CM: E78.5  ICD-9-CM: 272.4  7/13/2021 - Present Unknown        H/O: CVA (cerebrovascular accident) ICD-10-CM: Z86.73  ICD-9-CM: V12.54  7/13/2021 - Present Unknown        Right internal carotid occlusion ICD-10-CM: I65.21  ICD-9-CM: 433.10  7/13/2021 - Present Unknown        * (Principal) Cellulitis of foot, left ICD-10-CM: L03.116  ICD-9-CM: 682.7  7/12/2021 - Present Yes        Tobacco abuse ICD-10-CM: Z72.0  ICD-9-CM: 305.1  7/12/2021 - Present Unknown        Non-compliant patient ICD-10-CM: Z91.19  ICD-9-CM: V15.81  5/22/2020 - Present Yes        Osteomyelitis ([facility] ICD-10-CM: M86.9  ICD-9-CM: 730.20  4/30/2020 - Present Unknown                   Signed By: Mode Henry MD   [address] [place], [place] [address]ist Service    July 15, 2021

## 2021-07-15 NOTE — ADDENDUM NOTE
Addendum  created 07/15/21 1024 by Noemy Gonzalez CRNA    Flowsheet accepted, Intraprocedure Flowsheets edited

## 2021-07-15 NOTE — PROGRESS NOTES
Patient complained of having an urge to pee but not being able to void. Patient has managed to obtain about 200mL of yellow clear urine in bedside urinal. Bladder scanned patient and observed about 300mL of urine in the bladder. Will continue to monitor.

## 2021-07-15 NOTE — PROGRESS NOTES
Problem: Pain  Goal: *Control of Pain  Outcome: Progressing Towards Goal     Problem: Patient Education: Go to Patient Education Activity  Goal: Patient/Family Education  Outcome: Progressing Towards Goal     Problem: Falls - Risk of  Goal: *Absence of Falls  Description: Document Kalyani Patricio Fall Risk and appropriate interventions in the flowsheet. Outcome: Progressing Towards Goal  Note: Fall Risk Interventions:            Medication Interventions: Teach patient to arise slowly                   Problem: Patient Education: Go to Patient Education Activity  Goal: Patient/Family Education  Outcome: Progressing Towards Goal     Problem: Nausea/Vomiting (Adult)  Goal: *Absence of nausea/vomiting  Outcome: Progressing Towards Goal     Problem: Patient Education: Go to Patient Education Activity  Goal: Patient/Family Education  Outcome: Progressing Towards Goal     Problem: General Infection Care Plan (Adult and Pediatric)  Goal: Improvement in signs and symptoms of infection  Outcome: Progressing Towards Goal  Goal: *Optimize nutritional status  Outcome: Progressing Towards Goal     Problem: Patient Education: Go to Patient Education Activity  Goal: Patient/Family Education  Outcome: Progressing Towards Goal     Problem: Risk for Spread of Infection  Goal: Prevent transmission of infectious organism to others  Description: Prevent the transmission of infectious organisms to other patients, staff members, and visitors.   Outcome: Progressing Towards Goal     Problem: Patient Education:  Go to Education Activity  Goal: Patient/Family Education  Outcome: Progressing Towards Goal

## 2021-07-16 ENCOUNTER — HOME HEALTH ADMISSION (OUTPATIENT)
Dept: HOME HEALTH SERVICES | Facility: HOME HEALTH | Age: 66
End: 2021-07-16

## 2021-07-16 VITALS
HEIGHT: 73 IN | HEART RATE: 73 BPM | OXYGEN SATURATION: 97 % | BODY MASS INDEX: 23.86 KG/M2 | SYSTOLIC BLOOD PRESSURE: 142 MMHG | DIASTOLIC BLOOD PRESSURE: 91 MMHG | RESPIRATION RATE: 20 BRPM | WEIGHT: 180 LBS | TEMPERATURE: 98 F

## 2021-07-16 LAB
ANION GAP SERPL CALC-SCNC: 4 MMOL/L (ref 7–16)
BACTERIA SPEC CULT: ABNORMAL
BACTERIA SPEC CULT: ABNORMAL
BUN SERPL-MCNC: 18 MG/DL (ref 8–23)
CALCIUM SERPL-MCNC: 8.6 MG/DL (ref 8.3–10.4)
CHLORIDE SERPL-SCNC: 114 MMOL/L (ref 98–107)
CO2 SERPL-SCNC: 29 MMOL/L (ref 21–32)
CREAT SERPL-MCNC: 1.46 MG/DL (ref 0.8–1.5)
GLUCOSE BLD STRIP.AUTO-MCNC: 103 MG/DL (ref 65–100)
GLUCOSE BLD STRIP.AUTO-MCNC: 108 MG/DL (ref 65–100)
GLUCOSE SERPL-MCNC: 98 MG/DL (ref 65–100)
GRAM STN SPEC: ABNORMAL
GRAM STN SPEC: ABNORMAL
MAGNESIUM SERPL-MCNC: 2.1 MG/DL (ref 1.8–2.4)
POTASSIUM SERPL-SCNC: 4 MMOL/L (ref 3.5–5.1)
SERVICE CMNT-IMP: ABNORMAL
SODIUM SERPL-SCNC: 147 MMOL/L (ref 136–145)

## 2021-07-16 PROCEDURE — 36415 COLL VENOUS BLD VENIPUNCTURE: CPT

## 2021-07-16 PROCEDURE — 82962 GLUCOSE BLOOD TEST: CPT

## 2021-07-16 PROCEDURE — 74011250637 HC RX REV CODE- 250/637: Performed by: ORTHOPAEDIC SURGERY

## 2021-07-16 PROCEDURE — 74011250636 HC RX REV CODE- 250/636: Performed by: ORTHOPAEDIC SURGERY

## 2021-07-16 PROCEDURE — 83735 ASSAY OF MAGNESIUM: CPT

## 2021-07-16 PROCEDURE — 80048 BASIC METABOLIC PNL TOTAL CA: CPT

## 2021-07-16 RX ORDER — ASPIRIN 81 MG/1
81 TABLET ORAL DAILY
Qty: 30 TABLET | Refills: 1 | Status: SHIPPED
Start: 2021-07-17

## 2021-07-16 RX ORDER — LISINOPRIL 10 MG/1
10 TABLET ORAL DAILY
Qty: 30 TABLET | Refills: 3 | Status: SHIPPED | OUTPATIENT
Start: 2021-07-17

## 2021-07-16 RX ORDER — CLINDAMYCIN HYDROCHLORIDE 300 MG/1
300 CAPSULE ORAL 3 TIMES DAILY
Qty: 30 CAPSULE | Refills: 0 | Status: SHIPPED | OUTPATIENT
Start: 2021-07-16 | End: 2021-07-26

## 2021-07-16 RX ORDER — ATORVASTATIN CALCIUM 40 MG/1
40 TABLET, FILM COATED ORAL
Qty: 30 TABLET | Refills: 3 | Status: SHIPPED | OUTPATIENT
Start: 2021-07-16

## 2021-07-16 RX ORDER — HYDROCODONE BITARTRATE AND ACETAMINOPHEN 5; 325 MG/1; MG/1
1 TABLET ORAL
Qty: 10 TABLET | Refills: 0 | Status: SHIPPED | OUTPATIENT
Start: 2021-07-16 | End: 2021-07-19

## 2021-07-16 RX ADMIN — LISINOPRIL 10 MG: 5 TABLET ORAL at 09:29

## 2021-07-16 RX ADMIN — ASPIRIN 81 MG: 81 TABLET ORAL at 09:29

## 2021-07-16 RX ADMIN — Medication 10 ML: at 05:06

## 2021-07-16 RX ADMIN — Medication 10 ML: at 14:23

## 2021-07-16 RX ADMIN — SODIUM CHLORIDE 75 ML/HR: 900 INJECTION, SOLUTION INTRAVENOUS at 06:19

## 2021-07-16 RX ADMIN — Medication 1 AMPULE: at 09:30

## 2021-07-16 NOTE — PROGRESS NOTES
Problem: Pain  Goal: *Control of Pain  Outcome: Progressing Towards Goal     Problem: Patient Education: Go to Patient Education Activity  Goal: Patient/Family Education  Outcome: Progressing Towards Goal     Problem: Falls - Risk of  Goal: *Absence of Falls  Description: Document Renita Garcia Fall Risk and appropriate interventions in the flowsheet. Outcome: Progressing Towards Goal  Note: Fall Risk Interventions:            Medication Interventions: Teach patient to arise slowly                   Problem: Patient Education: Go to Patient Education Activity  Goal: Patient/Family Education  Outcome: Progressing Towards Goal     Problem: Nausea/Vomiting (Adult)  Goal: *Absence of nausea/vomiting  Outcome: Progressing Towards Goal     Problem: Patient Education: Go to Patient Education Activity  Goal: Patient/Family Education  Outcome: Progressing Towards Goal     Problem: General Infection Care Plan (Adult and Pediatric)  Goal: Improvement in signs and symptoms of infection  Outcome: Progressing Towards Goal  Goal: *Optimize nutritional status  Outcome: Progressing Towards Goal     Problem: Patient Education: Go to Patient Education Activity  Goal: Patient/Family Education  Outcome: Progressing Towards Goal     Problem: Risk for Spread of Infection  Goal: Prevent transmission of infectious organism to others  Description: Prevent the transmission of infectious organisms to other patients, staff members, and visitors.   Outcome: Progressing Towards Goal     Problem: Patient Education:  Go to Education Activity  Goal: Patient/Family Education  Outcome: Progressing Towards Goal

## 2021-07-16 NOTE — PROGRESS NOTES
Patient's wound packing was removed from L ankle today - wound care plans for wet-to-dry dressing changes twice a day. Continue iv abx. CM following.

## 2021-07-16 NOTE — PROGRESS NOTES
Received a call from [facility] notifying me that they will be unable to admit patient due to lack of staffing. Referral sent to [facility].

## 2021-07-16 NOTE — WOUND CARE
Pt seen for moist to dry saline packing to left lateral ankle wound per ortho. Currently dressing was already removed, pt c/o pain to touch to area. Wound present to left lateral ankle, cleansed with normal saline noted small tunnel present at 7 o'clock measuring 1 cm deep. Re-packed with saline moistened gauze into wound covered with ABD pad and secured with kerlix. Per ortho MD change dressing 2 times per day. Wound team will continue to follow as needed while in acute care setting.

## 2021-07-16 NOTE — PROGRESS NOTES
Hourly rounds complete this shift. Patient alert and oriented x4. No new complaints at this time. Bed in low, locked position, CLWR bedside table within reach. Patient in bed resting. NAD Prescribed medications given. IV clean, dry, and intact. IV infusing.

## 2021-07-16 NOTE — PROGRESS NOTES
Patient is scheduled for d/c today. Patient plans to go home - stated he has follow-ups scheduled and will be able to get himself there. Arranged [facility] [place] for RN wound care to assist with dressing changes/education. No other needs at this time. Care Management Interventions  PCP Verified by CM:  Yes  Mode of Transport at Discharge: Self  Transition of Care Consult (CM Consult): [address]: Yes  Discharge Durable Medical Equipment: No  Physical Therapy Consult: No  Occupational Therapy Consult: No  Current Support Network: Own Home  Confirm Follow Up Transport: Self  The Patient and/or Patient Representative was Provided with a Choice of Provider and Agrees with the Discharge Plan?: Yes  Freedom of Choice List was Provided with Basic Dialogue that Supports the Patient's Individualized Plan of Care/Goals, Treatment Preferences and Shares the Quality Data Associated with the Providers?: Yes  [place] Resource Information Provided?: No  Discharge Location  Discharge Placement: Home with home health [facility])

## 2021-07-16 NOTE — PROGRESS NOTES
Hourly rounds completed this shift. Patient A&O x4. No new complaints at this time. Bed in low, locked position, CLWR and bedside table within reach. Patient in bed resting. NAD. Prescribed medications given. IV clean, dry, and intact. IV infusing. Will continue to monitor. Report given to day shift nurse.

## 2021-07-16 NOTE — DISCHARGE SUMMARY
Hospitalist Discharge Summary     Patient ID:  Gildardo Lynn  [id number]  6 y.o.  1955  Admit date: 7/12/2021 12:41 PM  Discharge date and time: 7/16/2021  Attending: Junior Almendarez MD  PCP:  Qiana Stone MD  Treatment Team: Attending Provider: Junior Almendarez MD; Consulting Provider: Kourtney Saleh NP; Consulting Provider: Zabrina Gomez MD; Utilization Review: Phong Hernandez; Care Manager: Genevieve Mays, RN; Primary Nurse: Gary Fraser RN; Charge Nurse: Joseline Ribeiro, RIRI    Principal Diagnosis Cellulitis of foot, left   Principal Problem:    Cellulitis of foot, left (7/12/2021)    Active Problems:    Osteomyelitis ([address] (4/30/2020)      Non-compliant patient (5/22/2020)      Tobacco abuse (7/12/2021)      HTN (hypertension) (7/13/2021)      Hyperlipidemia (7/13/2021)      H/O: CVA (cerebrovascular accident) (7/13/2021)      Right internal carotid occlusion (7/13/2021)         Hospital Course:  74 y. o. male with medical history of hypertension who presented as direct admission from Dr. Lucie Hearn. Pt says he also h/o cva. Says not on any medications at home.     Based on pcp notes- h/o rt carotid artery occlusion[2016],htn,hyperlipidemia and cva [2016]  Pt admitted for left ankle cellulitis to r/o osteomyelitis. Previous h/o leaving AMA after left ankle debridement and not completing the antibiotics. Was seen by Ortho Dr. Juan Carlos Dunham who did Excision and debridement, Wound Culture growing MRSA sens to clinda and bactrim. He wants to start clinda and see pt in clinic and wound care as follows. Wet-to-dry dressing changes twice a day with normal saline. Prescriptions given for BP, Pain meds and clinda. Plan discussed with pt who is in agreement with the plan. All questions answered. Pt was stable at time of discharge. Follow up as per below.     Significant Diagnostic Imaging:     Labs: Results:       Chemistry Recent Labs     07/16/21  0500 07/15/21  0525 07/14/21  0422 GLU 98  --  98   *  --  143   K 4.0  --  3.8   *  --  110*   CO2 29  --  28   BUN 18  --  13   CREA 1.46 1.27 1.02   CA 8.6  --  8.9   AGAP 4*  --  5*      CBC w/Diff Recent Labs     07/14/21  0422   WBC 4.9   RBC 4.03*   HGB 11.0*   HCT 34.8*      GRANS 60   LYMPH 27   EOS 1      Cardiac Enzymes No results for input(s): CPK, CKND1, MAURICIO in the last 72 hours. No lab exists for component: CKRMB, TROIP   Coagulation No results for input(s): PTP, INR, APTT, INREXT in the last 72 hours. Last A1c No results found for: HBA1C, LPC9TJKS, SOW4RGWG   Lipid Panel Lab Results   Component Value Date/Time    Cholesterol, total 147 07/14/2021 04:22 AM    HDL Cholesterol 43 07/14/2021 04:22 AM    LDL, calculated 94.4 07/14/2021 04:22 AM    VLDL, calculated 9.6 07/14/2021 04:22 AM    Triglyceride 48 07/14/2021 04:22 AM    CHOL/HDL Ratio 3.4 07/14/2021 04:22 AM      BNP No results for input(s): BNPP in the last 72 hours. Liver Enzymes No results for input(s): TP, ALB, TBIL, AP in the last 72 hours. No lab exists for component: SGOT, GPT, DBIL   Thyroid Studies No results found for: T4, T3U, TSH, TSHEXT       XR ANKLE LT MIN 3 V    Result Date: 7/12/2021  Left Ankle INDICATION: Ankle pain and swelling Three views of the left ankle were obtained, compared with 05/21/2020 FINDINGS: There is an old distal left fibular fracture. Fracture line is no longer visible. There is no discrete bone erosion. Distal tibia and talus are intact. Several tiny metal fragments are still present in the soft tissues.      No plain film evidence of fracture or osteomyelitis        Discharge Exam:  Patient Vitals for the past 24 hrs:   Temp Pulse Resp BP SpO2   07/16/21 1056 98 °F (36.7 °C) 73 20 (!) 142/91 97 %   07/16/21 0715 98.7 °F (37.1 °C) 78 20 (!) 152/80 98 %   07/16/21 0328 98.4 °F (36.9 °C) 79 19 (!) 166/85 97 %   07/15/21 2314 98.7 °F (37.1 °C) 63 19 (!) 149/63 99 %   07/15/21 1932 98.5 °F (36.9 °C) 73 19 (!) 148/78 99 %   07/15/21 1602 98.3 °F (36.8 °C) 70 19 131/61 94 %     Visit Vitals  BP (!) 142/91 (BP 1 Location: Left arm, BP Patient Position: At rest)   Pulse 73   Temp 98 °F (36.7 °C)   Resp 20   Ht 6' 1\" (1.854 m)   Wt 81.6 kg (180 lb)   SpO2 97%   BMI 23.75 kg/m²     General appearance: alert, cooperative, no distress  Lungs: CTABL  Heart: RRR, no m/r/g  Abdomen: soft, non-tender. Bowel sounds normal.   Extremities: no cyanosis. Left foot bandaged  Neurologic: Grossly normal    Disposition: home health  Discharge Condition: stable  Patient Instructions: Activity and weight bearing as tolerated, diet as recommended. Wound Care:  wet-to-dry dressing changes twice a day with normal saline    Current Discharge Medication List      START taking these medications    Details   aspirin delayed-release 81 mg tablet Take 1 Tablet by mouth daily. Qty: 30 Tablet, Refills: 1  Start date: 7/17/2021      atorvastatin (LIPITOR) 40 mg tablet Take 1 Tablet by mouth nightly. Qty: 30 Tablet, Refills: 3  Start date: 7/16/2021      lisinopriL (PRINIVIL, ZESTRIL) 10 mg tablet Take 1 Tablet by mouth daily. Qty: 30 Tablet, Refills: 3  Start date: 7/17/2021      clindamycin (CLEOCIN) 300 mg capsule Take 1 Capsule by mouth three (3) times daily for 10 days. Qty: 30 Capsule, Refills: 0  Start date: 7/16/2021, End date: 7/26/2021      HYDROcodone-acetaminophen (Norco) 5-325 mg per tablet Take 1 Tablet by mouth every six (6) hours as needed for Pain for up to 3 days. Max Daily Amount: 4 Tablets. Qty: 10 Tablet, Refills: 0  Start date: 7/16/2021, End date: 7/19/2021    Associated Diagnoses: Postoperative wound infection             Vaccinations:   Pt screened by nursing for pneumococcal and influenza vaccination needs at discharge, will be ordered if needed and pt consented.   Immunization History   Administered Date(s) Administered    TB Skin Test (PPD) Intradermal 04/30/2020, 05/21/2020       Follow-up Information     Follow up With Specialties Details Why Contact Info    Qiana Stone MD Internal Medicine   [address] [address]  [phone]            Time spent in the discharge process was 35 minutes.       Signed By: Harpreet Hsieh MD     July 16, 2021

## 2021-07-16 NOTE — PROGRESS NOTES
Progress Note    Patient: Bailey Greer MRN: [id number]  SSN: [id number]    YOB: 1955  Age: 74 y.o. Sex: male      Admit Date: 7/12/2021    LOS: 2 days     Subjective:     Patient says he has very little pain in his left ankle, he just will take a shower which I told him there is a reason he cannot do that    Objective:     Vitals:    07/15/21 2314 07/16/21 0328 07/16/21 0715 07/16/21 1056   BP: (!) 149/63 (!) 166/85 (!) 152/80 (!) 142/91   Pulse: 63 79 78 73   Resp: 19 19 20 20   Temp: 98.7 °F (37.1 °C) 98.4 °F (36.9 °C) 98.7 °F (37.1 °C) 98 °F (36.7 °C)   SpO2: 99% 97% 98% 97%   Weight:       Height:            Intake and Output:  Current Shift: No intake/output data recorded. Last three shifts: 07/14 1901 - 07/16 0700  In: 480 [P.O.:480]  Out: 1325 [Urine:1325]    alert and oriented to at least person  Skin -his left ankle incision actually appears to be doing pretty well. His redness appears to be better and he has just some mild serous drainage after I removed his packing today  Motor and sensory function intact in LEFT LOWER extremity  Pulses palpable in LEFT LOWER extremity       Lab/Data Review:  CBC: No results found for: WBC, HGB, HGBEXT, HCT, HCTEXT, PLT, PLTEXT, HGBEXT, HCTEXT, PLTEXT       Assessment:     Acute postop blood loss anemia with hemoglobin stable now, POD #2 from left ankle I&D    Plan:     I have removed the packing from his left ankle.   The wound care team congested wet-to-dry dressing changes twice a day with normal saline and continue his IV antibiotics    Signed By: Al Mao MD     July 16, 2021

## 2021-07-17 LAB
BACTERIA SPEC CULT: ABNORMAL
BACTERIA SPEC CULT: NORMAL
BACTERIA SPEC CULT: NORMAL
GRAM STN SPEC: ABNORMAL
SERVICE CMNT-IMP: ABNORMAL
SERVICE CMNT-IMP: ABNORMAL
SERVICE CMNT-IMP: NORMAL
SERVICE CMNT-IMP: NORMAL

## 2021-07-19 NOTE — PROGRESS NOTES
CM received call from Enrike Echevarria 148 with [facility] [phone], who confirmed a delay in care, due to patient declining initial home visit. Staff has been unable to reschedule visit, due to staff being unable to reach patient. CM was receptive to this information. CM remains available.

## 2021-07-29 NOTE — ADT AUTH CERT NOTES
URGENT REQUEST:       [facility]#[id number]  PLEASE SEND FAX OR CALL WITH UPDATE OF DAYS APPROVED AND STATUS OF P2P DC SUMMARY HAS BEEN SUBMITTED ON 7/27     [facility]     [phone] [facility]  [phone] PHONE

## 2021-08-03 PROBLEM — I10 HIGH BLOOD PRESSURE: Status: RESOLVED | Noted: 2020-05-22 | Resolved: 2021-08-03

## 2021-12-28 ENCOUNTER — HOSPITAL ENCOUNTER (EMERGENCY)
Age: 66
Discharge: HOME | End: 2021-12-28
Attending: EMERGENCY MEDICINE
Payer: MEDICARE

## 2021-12-28 VITALS
OXYGEN SATURATION: 94 % | TEMPERATURE: 98.8 F | RESPIRATION RATE: 18 BRPM | BODY MASS INDEX: 23.86 KG/M2 | HEART RATE: 88 BPM | DIASTOLIC BLOOD PRESSURE: 93 MMHG | WEIGHT: 180 LBS | HEIGHT: 73 IN | SYSTOLIC BLOOD PRESSURE: 147 MMHG

## 2021-12-28 DIAGNOSIS — T81.89XA NON-HEALING SURGICAL WOUND, INITIAL ENCOUNTER: Primary | ICD-10-CM

## 2021-12-28 LAB
ALBUMIN SERPL-MCNC: 2.8 G/DL (ref 3.2–4.6)
ALBUMIN/GLOB SERPL: 0.6 {RATIO} (ref 1.2–3.5)
ALP SERPL-CCNC: 64 U/L (ref 50–136)
ALT SERPL-CCNC: 32 U/L (ref 12–65)
ANION GAP SERPL CALC-SCNC: 5 MMOL/L (ref 7–16)
AST SERPL-CCNC: 46 U/L (ref 15–37)
BASOPHILS # BLD: 0 K/UL (ref 0–0.2)
BASOPHILS NFR BLD: 0 % (ref 0–2)
BILIRUB SERPL-MCNC: 0.4 MG/DL (ref 0.2–1.1)
BUN SERPL-MCNC: 15 MG/DL (ref 8–23)
CALCIUM SERPL-MCNC: 9.5 MG/DL (ref 8.3–10.4)
CHLORIDE SERPL-SCNC: 112 MMOL/L (ref 98–107)
CO2 SERPL-SCNC: 26 MMOL/L (ref 21–32)
CREAT SERPL-MCNC: 0.94 MG/DL (ref 0.8–1.5)
DIFFERENTIAL METHOD BLD: ABNORMAL
EOSINOPHIL # BLD: 0 K/UL (ref 0–0.8)
EOSINOPHIL NFR BLD: 0 % (ref 0.5–7.8)
ERYTHROCYTE [DISTWIDTH] IN BLOOD BY AUTOMATED COUNT: 13.4 % (ref 11.9–14.6)
GLOBULIN SER CALC-MCNC: 5 G/DL (ref 2.3–3.5)
GLUCOSE SERPL-MCNC: 102 MG/DL (ref 65–100)
HCT VFR BLD AUTO: 33.3 % (ref 41.1–50.3)
HGB BLD-MCNC: 10.7 G/DL (ref 13.6–17.2)
IMM GRANULOCYTES # BLD AUTO: 0 K/UL (ref 0–0.5)
IMM GRANULOCYTES NFR BLD AUTO: 0 % (ref 0–5)
LYMPHOCYTES # BLD: 1.3 K/UL (ref 0.5–4.6)
LYMPHOCYTES NFR BLD: 21 % (ref 13–44)
MCH RBC QN AUTO: 27.1 PG (ref 26.1–32.9)
MCHC RBC AUTO-ENTMCNC: 32.1 G/DL (ref 31.4–35)
MCV RBC AUTO: 84.3 FL (ref 79.6–97.8)
MONOCYTES # BLD: 0.6 K/UL (ref 0.1–1.3)
MONOCYTES NFR BLD: 9 % (ref 4–12)
NEUTS SEG # BLD: 4.5 K/UL (ref 1.7–8.2)
NEUTS SEG NFR BLD: 70 % (ref 43–78)
NRBC # BLD: 0 K/UL (ref 0–0.2)
PLATELET # BLD AUTO: 339 K/UL (ref 150–450)
PLATELET COMMENTS,PCOM: ADEQUATE
PMV BLD AUTO: 10.4 FL (ref 9.4–12.3)
POTASSIUM SERPL-SCNC: 3.8 MMOL/L (ref 3.5–5.1)
PROT SERPL-MCNC: 7.8 G/DL (ref 6.3–8.2)
RBC # BLD AUTO: 3.95 M/UL (ref 4.23–5.6)
RBC MORPH BLD: ABNORMAL
SODIUM SERPL-SCNC: 143 MMOL/L (ref 138–145)
WBC # BLD AUTO: 6.4 K/UL (ref 4.3–11.1)
WBC MORPH BLD: ABNORMAL

## 2021-12-28 PROCEDURE — 85025 COMPLETE CBC W/AUTO DIFF WBC: CPT

## 2021-12-28 PROCEDURE — 99282 EMERGENCY DEPT VISIT SF MDM: CPT

## 2021-12-28 PROCEDURE — 80053 COMPREHEN METABOLIC PANEL: CPT

## 2021-12-28 RX ORDER — DOXYCYCLINE HYCLATE 100 MG
100 TABLET ORAL 2 TIMES DAILY
Qty: 14 TABLET | Refills: 0 | Status: SHIPPED | OUTPATIENT
Start: 2021-12-28 | End: 2022-01-04

## 2021-12-28 NOTE — ED TRIAGE NOTES
Arrives with face mask in place. Assisted into triage via wheelchair by ED staff. Reports wound to left ankle. States \"its been doing this for like 2 years\". Drainage noted to sock. Pt agitated and upset in triage. Reports has been seen by ortho, wound clinic without relief of symptoms. Last completed abx couple weeks ago. Reports has been taking blackseed oil/fish oil.   Denies fevers

## 2021-12-28 NOTE — ED NOTES
I have reviewed discharge instructions with the patient. The patient verbalized understanding. Patient left ED via Discharge Method: wheelchair to Home with self. Opportunity for questions and clarification provided. Patient given 1 scripts. To continue your aftercare when you leave the hospital, you may receive an automated call from our care team to check in on how you are doing. This is a free service and part of our promise to provide the best care and service to meet your aftercare needs.  If you have questions, or wish to unsubscribe from this service please call [phone]. Thank you for Choosing our [facility] Emergency Department.

## 2021-12-28 NOTE — ED PROVIDER NOTES
68-year-old male presents with persistent wound to his left ankle. He had a trimalleolar fracture in 2019 and has had complications since with multiple surgeries from osteomyelitis and recurrent wounds requiring debridement. He is followed at orthopedic clinic through [facility], but states he no longer wants to go there. Also does not want to go to the wound clinic at [facility] anymore. Denies any new injuries. He has been seeing a \"witch doctor\" and applying homeopathic therapies. No fever. Also requesting food for his dog and states he has not eaten in days. Wound Infection   Pertinent negatives include no fever. Past Medical History:   Diagnosis Date    Stroke [facility])        Past Surgical History:   Procedure Laterality Date    HX ORTHOPAEDIC      Left ankle fx with pinning         No family history on file.     Social History     Socioeconomic History    Marital status: SINGLE     Spouse name: Not on file    Number of children: Not on file    Years of education: Not on file    Highest education level: Not on file   Occupational History    Not on file   Tobacco Use    Smoking status: Current Every Day Smoker     Packs/day: 0.50    Smokeless tobacco: Never Used   Substance and Sexual Activity    Alcohol use: Yes     Comment: occasional    Drug use: Not Currently    Sexual activity: Not on file   Other Topics Concern     Service Not Asked    Blood Transfusions Not Asked    Caffeine Concern Not Asked    Occupational Exposure Not Asked    Hobby Hazards Not Asked    Sleep Concern Not Asked    Stress Concern Not Asked    Weight Concern Not Asked    Special Diet Not Asked    Back Care Not Asked    Exercise Not Asked    Bike Helmet Not Asked   [place] Not Asked    Self-Exams Not Asked   Social History Narrative    Not on file     Social Determinants of Health     Financial Resource Strain:     Difficulty of Paying Living Expenses: Not on file   Food Insecurity:     Worried About [facility] in the Last Year: Not on file    Robin of Food in the Last Year: Not on file   Transportation Needs:     Lack of Transportation (Medical): Not on file    Lack of Transportation (Non-Medical): Not on file   Physical Activity:     Days of Exercise per Week: Not on file    Minutes of Exercise per Session: Not on file   Stress:     Feeling of Stress : Not on file   Social Connections:     Frequency of Communication with Friends and Family: Not on file    Frequency of Social Gatherings with Friends and Family: Not on file    Attends Zoroastrianism Services: Not on file    Active Member of [address] [place] [address] [employer] or Organizations: Not on file    Attends Club or Organization Meetings: Not on file    Marital Status: Not on file   Intimate Partner Violence:     Fear of Current or Ex-Partner: Not on file    Emotionally Abused: Not on file    Physically Abused: Not on file    Sexually Abused: Not on file   Housing Stability:     Unable to Pay for Housing in the Last Year: Not on file    Number of [place] in the Last Year: Not on file    Unstable Housing in the Last Year: Not on file         ALLERGIES: Patient has no known allergies. Review of Systems   Constitutional: Negative for fever. Skin: Positive for wound. All other systems reviewed and are negative. Vitals:    12/28/21 0453   BP: (!) 140/87   Pulse: 88   Resp: 18   Temp: 98.8 °F (37.1 °C)   SpO2: 98%   Weight: 81.6 kg (180 lb)   Height: 6' 1\" (1.854 m)            Physical Exam  Vitals and nursing note reviewed. HENT:      Head: Normocephalic and atraumatic. Nose: Nose normal.      Mouth/Throat:      Mouth: Mucous membranes are moist.   Eyes:      Pupils: Pupils are equal, round, and reactive to light. Cardiovascular:      Rate and Rhythm: Normal rate. Pulmonary:      Effort: Pulmonary effort is normal.   Musculoskeletal:         General: Tenderness present. Right lower leg: Edema present.       Left lower leg: Edema present. Skin:     Findings: Erythema and wound present. Neurological:      General: No focal deficit present. Mental Status: He is alert. Psychiatric:         Speech: Speech is tangential.      Comments: Bizarre behavior              MDM  Number of Diagnoses or Management Options  Non-healing surgical wound, initial encounter  Diagnosis management comments: Parts of this document were created using dragon voice recognition software. The chart has been reviewed but errors may still be present. I wore appropriate PPE throughout this patient's ED visit. Isamar Benson MD, 1:16 PM    Referred to wound care center. Will rx doxy due to h/o MRSA. I discussed the results of all labs, procedures, radiographs, and treatments with the patient and available family. Treatment plan is agreed upon and the patient is ready for discharge. Questions about treatment in the ED and differential diagnosis of presenting condition were answered. Patient was given verbal discharge instructions including, but not limited to, importance of returning to the emergency department for any concern of worsening or continued symptoms. Instructions were given to follow up with a primary care provider or specialist within 1-2 days. Adverse effects of medications, if prescribed, were discussed and patient was advised to refrain from significant physical activity until followed up by primary care physician and to not drive or operate heavy machinery after taking any sedating substances.            Amount and/or Complexity of Data Reviewed  Clinical lab tests: ordered and reviewed (Results for orders placed or performed during the hospital encounter of 12/28/21  -CBC WITH AUTOMATED DIFF:        Result                      Value             Ref Range           WBC                         6.4               4.3 - 11.1 K*       RBC                         3.95 (L)          4.23 - 5.6 M*       HGB                         10.7 (L)          13.6 - 17.2 *       HCT                         33.3 (L)          41.1 - 50.3 %       MCV                         84.3              79.6 - 97.8 *       MCH                         27.1              26.1 - 32.9 *       MCHC                        32.1              31.4 - 35.0 *       RDW                         13.4              11.9 - 14.6 %       PLATELET                    339               150 - 450 K/*       MPV                         10.4              9.4 - 12.3 FL       ABSOLUTE NRBC               0.00              0.0 - 0.2 K/*       NEUTROPHILS                 70                43 - 78 %           LYMPHOCYTES                 21                13 - 44 %           MONOCYTES                   9                 4.0 - 12.0 %        EOSINOPHILS                 0 (L)             0.5 - 7.8 %         BASOPHILS                   0                 0.0 - 2.0 %         IMMATURE GRANULOCYTES       0                 0.0 - 5.0 %         ABS. NEUTROPHILS            4.5               1.7 - 8.2 K/*       ABS. LYMPHOCYTES            1.3               0.5 - 4.6 K/*       ABS. MONOCYTES              0.6               0.1 - 1.3 K/*       ABS. EOSINOPHILS            0.0               0.0 - 0.8 K/*       ABS. BASOPHILS              0.0               0.0 - 0.2 K/*       ABS. IMM.  GRANS.            0.0               0.0 - 0.5 K/*       RBC COMMENTS                                                  NORMOCYTIC/NORMOCHROMIC       WBC COMMENTS                                                  Result Confirmed By Smear       PLATELET COMMENTS           ADEQUATE                              DF                          AUTOMATED                        -METABOLIC PANEL, COMPREHENSIVE:        Result                      Value             Ref Range           Sodium                      143               138 - 145 mm*       Potassium                   3.8               3.5 - 5.1 mm*       Chloride                    112 (H) 98 - 107 mmo*       CO2                         26                21 - 32 mmol*       Anion gap                   5 (L)             7 - 16 mmol/L       Glucose                     102 (H)           65 - 100 mg/*       BUN                         15                8 - 23 MG/DL        Creatinine                  0.94              0.8 - 1.5 MG*       GFR est AA                  >60               >60 ml/min/1*       GFR est non-AA              >60               >60 ml/min/1*       Calcium                     9.5               8.3 - 10.4 M*       Bilirubin, total            0.4               0.2 - 1.1 MG*       ALT (SGPT)                  32                12 - 65 U/L         AST (SGOT)                  46 (H)            15 - 37 U/L         Alk.  phosphatase            64                50 - 136 U/L        Protein, total              7.8               6.3 - 8.2 g/*       Albumin                     2.8 (L)           3.2 - 4.6 g/*       Globulin                    5.0 (H)           2.3 - 3.5 g/*       A-G Ratio                   0.6 (L)           1.2 - 3.5      )           Procedures

## 2025-05-06 NOTE — PERIOP NOTES
During your pre-operative assessment today you received a sleep apnea screening.  Based on your score, it is recommended that you follow up with your primary care provider for further evaluation.    Tourniquet used. Inflated at [phone], down at 1106 for 8 min at 300.

## (undated) DEVICE — PADDING CAST W4INXL4YD ST COT COHESIVE HND TEARABLE SPEC

## (undated) DEVICE — BASIC SINGLE BASIN-LF: Brand: MEDLINE INDUSTRIES, INC.

## (undated) DEVICE — HANDPIECE SET WITH COAXIAL HIGH FLOW TIP AND SUCTION TUBE: Brand: INTERPULSE

## (undated) DEVICE — GAUZE PKG STRP IODOFRM 1/4X5YD --

## (undated) DEVICE — REM POLYHESIVE ADULT PATIENT RETURN ELECTRODE: Brand: VALLEYLAB

## (undated) DEVICE — Device

## (undated) DEVICE — (D)PREP SKN CHLRAPRP APPL 26ML -- CONVERT TO ITEM 371833

## (undated) DEVICE — TRAY PREP DRY W/ PREM GLV 2 APPL 6 SPNG 2 UNDPD 1 OVERWRAP

## (undated) DEVICE — STERILE TETRA-FLEX CF LF, 6IN X 11 YD: Brand: TETRA-FLEX™ CF

## (undated) DEVICE — LOWER EXTREMITY: Brand: MEDLINE INDUSTRIES, INC.

## (undated) DEVICE — BANDAGE,GAUZE,BULKEE II,4.5"X4.1YD,STRL: Brand: MEDLINE

## (undated) DEVICE — 7 DAY SILVER-COATED ANTIMICROBIAL BARRIER DRESSING: Brand: ACTICOAT 7  4" X 5"

## (undated) DEVICE — SUTURE ETHLN SZ 2-0 L18IN NONABSORBABLE BLK L26MM PS 3/8 585H

## (undated) DEVICE — SOLUTION IRRIG 3000ML 0.9% SOD CHL FLX CONT 0797208] ICU MEDICAL INC]

## (undated) DEVICE — SPONGE GZ W4XL4IN COT 12 PLY TYP VII WVN C FLD DSGN

## (undated) DEVICE — CONTAINER,SPECIMEN,O.R.STRL,4.5OZ: Brand: MEDLINE

## (undated) DEVICE — ZIMMER® STERILE DISPOSABLE TOURNIQUET CUFF WITH PLC, DUAL PORT, SINGLE BLADDER, 30 IN. (76 CM)

## (undated) DEVICE — SUT PROL 0 30IN CT1 BLU --

## (undated) DEVICE — DRAPE XR CASS L UNIV FIT ADH CLSR

## (undated) DEVICE — PAD,ABDOMINAL,5"X9",ST,LF,25/BX: Brand: MEDLINE INDUSTRIES, INC.